# Patient Record
Sex: FEMALE | Race: WHITE | NOT HISPANIC OR LATINO | Employment: FULL TIME | URBAN - NONMETROPOLITAN AREA
[De-identification: names, ages, dates, MRNs, and addresses within clinical notes are randomized per-mention and may not be internally consistent; named-entity substitution may affect disease eponyms.]

---

## 2017-11-08 PROBLEM — E66.9 OBESITY (BMI 30-39.9): Status: ACTIVE | Noted: 2017-11-08

## 2018-06-19 PROBLEM — E78.5 DYSLIPIDEMIA, GOAL LDL BELOW 130: Status: ACTIVE | Noted: 2018-06-19

## 2020-11-03 ENCOUNTER — TELEMEDICINE (OUTPATIENT)
Dept: CARDIOLOGY | Facility: CLINIC | Age: 60
End: 2020-11-03
Payer: COMMERCIAL

## 2020-11-03 VITALS — BODY MASS INDEX: 32.21 KG/M2 | WEIGHT: 225 LBS | HEIGHT: 70 IN

## 2020-11-03 DIAGNOSIS — I44.0 FIRST DEGREE AV BLOCK: Chronic | ICD-10-CM

## 2020-11-03 DIAGNOSIS — R07.2 PRECORDIAL CHEST PAIN: ICD-10-CM

## 2020-11-03 DIAGNOSIS — E78.5 DYSLIPIDEMIA, GOAL LDL BELOW 130: ICD-10-CM

## 2020-11-03 PROCEDURE — 99203 OFFICE O/P NEW LOW 30 MIN: CPT | Mod: 95,CR | Performed by: INTERNAL MEDICINE

## 2020-11-03 ASSESSMENT — ENCOUNTER SYMPTOMS
FOCAL WEAKNESS: 0
PND: 0
CHILLS: 0
BLURRED VISION: 0
SHORTNESS OF BREATH: 0
FEVER: 0
ABDOMINAL PAIN: 0
BRUISES/BLEEDS EASILY: 0
DIZZINESS: 0
PALPITATIONS: 1
FALLS: 0
WEAKNESS: 0
COUGH: 0
CLAUDICATION: 0
SORE THROAT: 0
NAUSEA: 0

## 2020-11-03 ASSESSMENT — FIBROSIS 4 INDEX: FIB4 SCORE: 0.72

## 2020-11-03 NOTE — LETTER
[facility] [place] [address],   [address]nd [address]  [place] [address]  Phone: [phone]  Fax: [phone]              Britany Montesinos  1960    Encounter Date: 11/3/2020    Kurtis Mcleod M.D.          PROGRESS NOTE:  Chief Complaint   Patient presents with   • Arrhythmia       Subjective:   Britany Montesinos is a 60 y.o. female who presents today in consultation from Dr. Marisol Friedman for evaluation of her history of chest pains and palpitations.  Looking back she had a normal lipid profile in 2014 but has been on supplemental testosterone and has remarkable dyslipidemia see in her labs from 2018 LDL was over 200 her total total cholesterol is over 300 with a very high HDL she has been having different concerns was recently diagnosed with coronavirus and so far consultation today was done via Zoom virtual visit.    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   • Dyslipidemia, goal LDL below 130 - unclear maybe due to testosterone    • First degree AV block    • Hot flashes    • Night sweat    • Perimenopausal      Past Surgical History:   Procedure Laterality Date   • PB SHLDR ARTHROSCOP,SURG,W/ROTAT CUFF REPB Right 2/6/2020    Procedure: RT SHOULDER ARTHROSCOPY POSSIBLE ARTHROTOMY ARTHROSCOPIC SUBACROMIAL DECOMPRESSION DISTAL CLAVCILE EXCISON POSSIBLE ROTATOR CUFF REPAIR POSSIBLE BICEPS TENOTOMY;  Surgeon: Faustino Leong M.D.;  Location: SURGERY [facility];  Service: Orthopedics   • BREAST IMPLANT REVISION     • GASTRIC BANDING LAPAROSCOPIC     • OPEN REDUCTION      WRIST   • OTHER NEUROLOGICAL SURG      radio frequency ablaton     Family History   Problem Relation Age of Onset   • Hypertension Mother    • Diabetes Mother    • Heart Disease Mother 77        CHF; age 85 in Aug 2020   • Other Father         Alzheimers   • Other Brother 41        sepsis     Social History     Socioeconomic History   •  Marital status:      Spouse name: Not on file   • Number of children: Not on file   • Years of education: Not on file   • Highest education level: Not on file   Occupational History   • Not on file   Social Needs   • Financial resource strain: Not on file   • Food insecurity     Worry: Not on file     Inability: Not on file   • Transportation needs     Medical: Not on file     Non-medical: Not on file   Tobacco Use   • Smoking status: Former Smoker     Packs/day: 1.00     Years: 5.00     Pack years: 5.00     Types: Cigarettes     Quit date: 1998     Years since quittin.2   • Smokeless tobacco: Never Used   • Tobacco comment: rare/social for smkg at the time   Substance and Sexual Activity   • Alcohol use: Not on file     Comment: few x/month if that   • Drug use: No   • Sexual activity: Yes     Partners: Male     Comment:    Lifestyle   • Physical activity     Days per week: Not on file     Minutes per session: Not on file   • Stress: Not on file   Relationships   • Social connections     Talks on phone: Not on file     Gets together: Not on file     Attends [employer] service: Not on file     Active member of club or organization: Not on file     Attends meetings of clubs or organizations: Not on file     Relationship status: Not on file   • Intimate partner violence     Fear of current or ex partner: Not on file     Emotionally abused: Not on file     Physically abused: Not on file     Forced sexual activity: Not on file   Other Topics Concern   • Not on file   Social History Narrative   • Not on file     Allergies   Allergen Reactions   • Codeine Nausea     Outpatient Encounter Medications as of 11/3/2020   Medication Sig Dispense Refill   • testosterone (TESTIM/ANDROGEL) 50 MG/5GM (1%) Gel gel Apply 50 mg to skin as directed every day.     • progesterone (PROMETRIUM) 100 MG Cap Take 100 mg by mouth every day.     • HYDROcodone-acetaminophen (NORCO) 5-325 MG Tab per tablet Take 1-2 Tabs by  "mouth every four hours as needed.     • Omega 3 1000 MG Cap Take  by mouth.     • Multiple Vitamin (MULTIVITAMINS PO) Take 1 Tab by mouth every day.     • estrogens, conjugated (PREMARIN) 0.625 MG/GM Cream Insert 0.5 g in vagina every day. Indications: natural estrogen product       No facility-administered encounter medications on file as of 11/3/2020.      Review of Systems   Constitutional: Positive for malaise/fatigue. Negative for chills and fever.   HENT: Negative for sore throat.    Eyes: Negative for blurred vision.   Respiratory: Negative for cough and shortness of breath.    Cardiovascular: Positive for chest pain and palpitations. Negative for claudication, leg swelling and PND.   Gastrointestinal: Negative for abdominal pain and nausea.   Musculoskeletal: Positive for joint pain. Negative for falls.   Skin: Negative for rash.   Neurological: Negative for dizziness, focal weakness and weakness.   Endo/Heme/Allergies: Does not bruise/bleed easily.        Objective:   Ht 1.778 m (5' 10\")   Wt 102.1 kg (225 lb)   LMP 02/04/2012   BMI 32.28 kg/m²     Physical Exam   Constitutional: No distress.   HENT:   Patient wearing a mask due to COVID precautions   Eyes: No scleral icterus.   Neck: No JVD present.   Cardiovascular: Normal rate and normal heart sounds. Exam reveals no gallop and no friction rub.   No murmur heard.  Pulmonary/Chest: No respiratory distress. She has no wheezes. She has no rales.   Abdominal: Soft. Bowel sounds are normal.   Musculoskeletal:         General: No edema.   Neurological: She is alert.   Skin: No rash noted. She is not diaphoretic.   Psychiatric: She has a normal mood and affect.     We reviewed in person the most recent labs  Recent Results (from the past 4032 hour(s))   CBC WITH DIFFERENTIAL    Collection Time: 08/07/20  3:40 PM   Result Value Ref Range    WBC 9.1 4.8 - 10.8 K/uL    RBC 5.04 4.20 - 5.40 M/uL    Hemoglobin 14.7 13.0 - 17.0 g/dL    Hematocrit 44.8 39.0 - 50.0 " %    MCV 88.9 81.0 - 99.0 fL    MCH 29.2 27.0 - 31.0 pg    MCHC 32.8 (L) 33.0 - 37.0 g/dL    RDW 13.7 11.5 - 14.5 %    Platelet Count 279 130 - 400 K/uL    MPV 9.4 7.4 - 10.4 fL    Neutrophils Automated 64.1 39.0 - 70.0 %    Lymphocytes Automated 29.1 21.0 - 50.0 %    Monocytes Automated 5.6 2.0 - 9.0 %    Eosinophils Automated 0.8 0.0 - 5.0 %    Basophils Automated 0.2 0.0 - 3.0 %    Abs Neutrophils Automated 5.8 1.8 - 7.7 K/uL    Abs Lymph Automated 2.6 1.2 - 4.8 K/uL    Eosinophil Count, Blood 0.07 0.00 - 0.50 K/uL   Comp Metabolic Panel    Collection Time: 08/07/20  3:40 PM   Result Value Ref Range    Sodium 139 136 - 145 mmol/L    Potassium 3.7 3.5 - 5.1 mmol/L    Chloride 103 98 - 107 mmol/L    Co2 27 21 - 32 mmol/L    Anion Gap 13 10 - 18 mmol/L    Glucose 100 (H) 74 - 99 mg/dL    Bun 19 (H) 7 - 18 mg/dL    Creatinine 1.2 (H) 0.6 - 1.0 mg/dL    Calcium 9.0 8.5 - 11.0 mg/dL    AST(SGOT) 20 15 - 37 U/L    ALT(SGPT) 33 12 - 78 U/L    Alkaline Phosphatase 95 46 - 116 U/L    Total Bilirubin 0.3 0.2 - 1.0 mg/dL    Albumin 4.1 3.4 - 5.0 g/dL    Total Protein 7.5 6.4 - 8.2 g/dL    A-G Ratio 1.2    LIPASE    Collection Time: 08/07/20  3:40 PM   Result Value Ref Range    Lipase 134 73 - 393 U/L   TROPONIN    Collection Time: 08/07/20  3:40 PM   Result Value Ref Range    Troponin I <0.02 0.00 - 0.06 ng/mL   ESTIMATED GFR    Collection Time: 08/07/20  3:40 PM   Result Value Ref Range    GFR If  56 (A) >60 mL/min/1.73 m 2    GFR If Non  46 (A) >60 mL/min/1.73 m 2   COVID/SARS CoV-2 PCR    Collection Time: 10/20/20  2:00 PM   Result Value Ref Range    COVID Order Status RECEIVED    SARS-COV Antigen CYDNEY    Collection Time: 10/20/20  2:00 PM   Result Value Ref Range    SARS-CoV-2 Source NP     SARS-COV ANTIGEN CYDNEY DETECTED (A) Not-Detected       Assessment:     1. Dyslipidemia, goal LDL below 130     2. First degree AV block  EC-ECHOCARDIOGRAM REST/STRESS W/O CONT   3. Precordial chest pain   EC-ECHOCARDIOGRAM REST/STRESS W/O CONT       Medical Decision Making:  Today's Assessment / Status / Plan:     It was my pleasure to meet with Ms. Montesinos.    Blood pressure is well controlled.  She will continue to monitor and eat hearty healthy diet.    Unfortunately she has developed severe dyslipidemia which was not present back in 2014 so I suspect this is mainly due to testosterone supplementation advised her to discuss with her physician the continued use of this medication because it certainly puts her at risk for early coronary artery disease we will evaluate this with a stress echo after she is continue to recuperate from her recent coronavirus infection    We will follow up with Ms. Montesinos on the results of the testing over the phone. We will determine further follow-up from there.    It is my pleasure to participate in the care of Ms. Montesinos.  Please do not hesitate to contact me with questions or concerns.    Kurtis Mcleod MD PhD [facility]  Cardiologist [facility]    Please note that this dictation was created using voice recognition software. There may be errors I did not discover before finalizing the note.     11/3/2020  4:57 PM          Marisol Friedman D.O.  [address] [place] [address]  Via In Basket     Dave Gama M.D.  [address] [place] [address]  Via Fax: [phone]

## 2020-11-04 NOTE — PROGRESS NOTES
Chief Complaint   Patient presents with   • Arrhythmia       Subjective:   Britany Montesinos is a 60 y.o. female who presents today in consultation from Dr. Marisol Friedman for evaluation of her history of chest pains and palpitations.  Looking back she had a normal lipid profile in 2014 but has been on supplemental testosterone and has remarkable dyslipidemia see in her labs from 2018 LDL was over 200 her total total cholesterol is over 300 with a very high HDL she has been having different concerns was recently diagnosed with coronavirus and so far consultation today was done via Zoom virtual visit.    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   • Dyslipidemia, goal LDL below 130 - unclear maybe due to testosterone    • First degree AV block    • Hot flashes    • Night sweat    • Perimenopausal      Past Surgical History:   Procedure Laterality Date   • PB SHLDR ARTHROSCOP,SURG,W/ROTAT CUFF REPB Right 2/6/2020    Procedure: RT SHOULDER ARTHROSCOPY POSSIBLE ARTHROTOMY ARTHROSCOPIC SUBACROMIAL DECOMPRESSION DISTAL CLAVCILE EXCISON POSSIBLE ROTATOR CUFF REPAIR POSSIBLE BICEPS TENOTOMY;  Surgeon: Faustino Leong M.D.;  Location: SURGERY [facility];  Service: Orthopedics   • BREAST IMPLANT REVISION     • GASTRIC BANDING LAPAROSCOPIC     • OPEN REDUCTION      WRIST   • OTHER NEUROLOGICAL SURG      radio frequency ablaton     Family History   Problem Relation Age of Onset   • Hypertension Mother    • Diabetes Mother    • Heart Disease Mother 77        CHF; age 85 in Aug 2020   • Other Father         Alzheimers   • Other Brother 41        sepsis     Social History     Socioeconomic History   • Marital status:      Spouse name: Not on file   • Number of children: Not on file   • Years of education: Not on file   • Highest education level: Not on file   Occupational History   • Not on file   Social Needs   • Financial resource strain: Not on file   • Food insecurity     Worry: Not  on file     Inability: Not on file   • Transportation needs     Medical: Not on file     Non-medical: Not on file   Tobacco Use   • Smoking status: Former Smoker     Packs/day: 1.00     Years: 5.00     Pack years: 5.00     Types: Cigarettes     Quit date: 1998     Years since quittin.2   • Smokeless tobacco: Never Used   • Tobacco comment: rare/social for smkg at the time   Substance and Sexual Activity   • Alcohol use: Not on file     Comment: few x/month if that   • Drug use: No   • Sexual activity: Yes     Partners: Male     Comment:    Lifestyle   • Physical activity     Days per week: Not on file     Minutes per session: Not on file   • Stress: Not on file   Relationships   • Social connections     Talks on phone: Not on file     Gets together: Not on file     Attends [employer] service: Not on file     Active member of club or organization: Not on file     Attends meetings of clubs or organizations: Not on file     Relationship status: Not on file   • Intimate partner violence     Fear of current or ex partner: Not on file     Emotionally abused: Not on file     Physically abused: Not on file     Forced sexual activity: Not on file   Other Topics Concern   • Not on file   Social History Narrative   • Not on file     Allergies   Allergen Reactions   • Codeine Nausea     Outpatient Encounter Medications as of 11/3/2020   Medication Sig Dispense Refill   • testosterone (TESTIM/ANDROGEL) 50 MG/5GM (1%) Gel gel Apply 50 mg to skin as directed every day.     • progesterone (PROMETRIUM) 100 MG Cap Take 100 mg by mouth every day.     • HYDROcodone-acetaminophen (NORCO) 5-325 MG Tab per tablet Take 1-2 Tabs by mouth every four hours as needed.     • Omega 3 1000 MG Cap Take  by mouth.     • Multiple Vitamin (MULTIVITAMINS PO) Take 1 Tab by mouth every day.     • estrogens, conjugated (PREMARIN) 0.625 MG/GM Cream Insert 0.5 g in vagina every day. Indications: natural estrogen product       No  "facility-administered encounter medications on file as of 11/3/2020.      Review of Systems   Constitutional: Positive for malaise/fatigue. Negative for chills and fever.   HENT: Negative for sore throat.    Eyes: Negative for blurred vision.   Respiratory: Negative for cough and shortness of breath.    Cardiovascular: Positive for chest pain and palpitations. Negative for claudication, leg swelling and PND.   Gastrointestinal: Negative for abdominal pain and nausea.   Musculoskeletal: Positive for joint pain. Negative for falls.   Skin: Negative for rash.   Neurological: Negative for dizziness, focal weakness and weakness.   Endo/Heme/Allergies: Does not bruise/bleed easily.        Objective:   Ht 1.778 m (5' 10\")   Wt 102.1 kg (225 lb)   LMP 02/04/2012   BMI 32.28 kg/m²     Vital signs are reported by the patient    General appears well  Eyes no icterus  Extremities no edema  Skin no apparent rashes    This encounter was conducted via [email or web address]  Video Virtual Visit.   Verbal consent was obtained. Patient's identity was verified.    We reviewed in person the most recent labs  Recent Results (from the past 4032 hour(s))   CBC WITH DIFFERENTIAL    Collection Time: 08/07/20  3:40 PM   Result Value Ref Range    WBC 9.1 4.8 - 10.8 K/uL    RBC 5.04 4.20 - 5.40 M/uL    Hemoglobin 14.7 13.0 - 17.0 g/dL    Hematocrit 44.8 39.0 - 50.0 %    MCV 88.9 81.0 - 99.0 fL    MCH 29.2 27.0 - 31.0 pg    MCHC 32.8 (L) 33.0 - 37.0 g/dL    RDW 13.7 11.5 - 14.5 %    Platelet Count 279 130 - 400 K/uL    MPV 9.4 7.4 - 10.4 fL    Neutrophils Automated 64.1 39.0 - 70.0 %    Lymphocytes Automated 29.1 21.0 - 50.0 %    Monocytes Automated 5.6 2.0 - 9.0 %    Eosinophils Automated 0.8 0.0 - 5.0 %    Basophils Automated 0.2 0.0 - 3.0 %    Abs Neutrophils Automated 5.8 1.8 - 7.7 K/uL    Abs Lymph Automated 2.6 1.2 - 4.8 K/uL    Eosinophil Count, Blood 0.07 0.00 - 0.50 K/uL   Comp Metabolic Panel    Collection Time: 08/07/20  3:40 PM   Result Value " Ref Range    Sodium 139 136 - 145 mmol/L    Potassium 3.7 3.5 - 5.1 mmol/L    Chloride 103 98 - 107 mmol/L    Co2 27 21 - 32 mmol/L    Anion Gap 13 10 - 18 mmol/L    Glucose 100 (H) 74 - 99 mg/dL    Bun 19 (H) 7 - 18 mg/dL    Creatinine 1.2 (H) 0.6 - 1.0 mg/dL    Calcium 9.0 8.5 - 11.0 mg/dL    AST(SGOT) 20 15 - 37 U/L    ALT(SGPT) 33 12 - 78 U/L    Alkaline Phosphatase 95 46 - 116 U/L    Total Bilirubin 0.3 0.2 - 1.0 mg/dL    Albumin 4.1 3.4 - 5.0 g/dL    Total Protein 7.5 6.4 - 8.2 g/dL    A-G Ratio 1.2    LIPASE    Collection Time: 08/07/20  3:40 PM   Result Value Ref Range    Lipase 134 73 - 393 U/L   TROPONIN    Collection Time: 08/07/20  3:40 PM   Result Value Ref Range    Troponin I <0.02 0.00 - 0.06 ng/mL   ESTIMATED GFR    Collection Time: 08/07/20  3:40 PM   Result Value Ref Range    GFR If  56 (A) >60 mL/min/1.73 m 2    GFR If Non  46 (A) >60 mL/min/1.73 m 2   COVID/SARS CoV-2 PCR    Collection Time: 10/20/20  2:00 PM   Result Value Ref Range    COVID Order Status RECEIVED    SARS-COV Antigen CYDNEY    Collection Time: 10/20/20  2:00 PM   Result Value Ref Range    SARS-CoV-2 Source NP     SARS-COV ANTIGEN YCDNEY DETECTED (A) Not-Detected       Assessment:     1. Dyslipidemia, goal LDL below 130     2. First degree AV block  EC-ECHOCARDIOGRAM REST/STRESS W/O CONT   3. Precordial chest pain  EC-ECHOCARDIOGRAM REST/STRESS W/O CONT       Medical Decision Making:  Today's Assessment / Status / Plan:     It was my pleasure to meet with Ms. Montesinos.    Blood pressure is well controlled.  She will continue to monitor and eat hearty healthy diet.    Unfortunately she has developed severe dyslipidemia which was not present back in 2014 so I suspect this is mainly due to testosterone supplementation advised her to discuss with her physician the continued use of this medication because it certainly puts her at risk for early coronary artery disease we will evaluate this with a stress echo  after she is continue to recuperate from her recent coronavirus infection    We will follow up with Ms. Montesinos on the results of the testing over the phone. We will determine further follow-up from there.    It is my pleasure to participate in the care of Ms. Montesinos.  Please do not hesitate to contact me with questions or concerns.    Kurtis Mcleod MD PhD [facility]  Cardiologist [facility]    Please note that this dictation was created using voice recognition software. There may be errors I did not discover before finalizing the note.     11/3/2020  4:57 PM

## 2020-11-13 ENCOUNTER — TELEPHONE (OUTPATIENT)
Dept: CARDIOLOGY | Facility: MEDICAL CENTER | Age: 60
End: 2020-11-13

## 2020-11-13 NOTE — TELEPHONE ENCOUNTER
CW/janell    Pt calling to ask you to fax the echo order to [facility], (pt does not have the fx#).    Britany is having the echo done there.     Please call Britany to confirm this has been taken care of, [phone] so she can schedule.

## 2020-11-16 NOTE — TELEPHONE ENCOUNTER
Called [facility] Lita Diop Shari, [phone], and reviewed patient request.  She instructs to sent Staff Message to [employer] regarding request.  She states if pt has not heard from [facility] after Monday scheduling to call, [phone] imaging schedulers.      Staff message sent to [facility] [employer] regarding request.    Called pt and reviewed findings.  She verbalizes understanding but states she is out and is unable to record [facility] imaging phone number.  Reassurance given that [facility]as[facility] will be sent to pt regarding phone number.  She states no other concerns or questions at this time and is appreciative of information given.    MyChart sent to pt regarding findings.

## 2020-12-04 DIAGNOSIS — I44.0 FIRST DEGREE AV BLOCK: Chronic | ICD-10-CM

## 2020-12-04 DIAGNOSIS — R07.2 PRECORDIAL CHEST PAIN: ICD-10-CM

## 2020-12-07 ENCOUNTER — PATIENT MESSAGE (OUTPATIENT)
Dept: CARDIOLOGY | Facility: CLINIC | Age: 60
End: 2020-12-07

## 2020-12-07 ENCOUNTER — TELEPHONE (OUTPATIENT)
Dept: CARDIOLOGY | Facility: MEDICAL CENTER | Age: 60
End: 2020-12-07

## 2020-12-07 DIAGNOSIS — R07.2 PRECORDIAL CHEST PAIN: ICD-10-CM

## 2020-12-07 DIAGNOSIS — R93.1 ABNORMAL ECHOCARDIOGRAM: ICD-10-CM

## 2020-12-07 DIAGNOSIS — R07.89 OTHER CHEST PAIN: ICD-10-CM

## 2020-12-07 NOTE — TELEPHONE ENCOUNTER
Received phone call from Shari [facility], and states pt will still be able to have Echo stress done since order was not fufilled.  She states she will call [facility] to follow up with testing and scheduling.

## 2020-12-07 NOTE — TELEPHONE ENCOUNTER
----- Message from Kurtis Mcleod M.D. sent at 12/4/2020  5:13 PM PST -----  The echo looks okay but wasn't a stress test, she should have a nuclear treadmil stress SPECT indication is abnormal echo and CP, please let her know     Thank you

## 2020-12-07 NOTE — TELEPHONE ENCOUNTER
Patient Call  Shari Villalobos  You 5 minutes ago (9:33 AM)     I left you a vm to asking if the provider wants the NM stress test he ordered on 12/4 done instead of the treadmill stress test that was left off the last exam by [facility].   Shari    Message text      Reviewed voicemail from Shari.    Received phone call from Shari and reviewed findings.  She states she will call [facility] so they can contact pt to have stress portion done on testing.      NM-Stress test cancelled.

## 2020-12-07 NOTE — TELEPHONE ENCOUNTER
"Called pt and reviewed MD recommendations.  She notes she does have CP at rest and \"only had 1 or 2 ever\" during her 30 minute [employer] ride.  She states she was told they did not do the stress test that day at the location testing was sent to so she did not want to wait and proceeded with Echo.  She verbalizes understanding and is requesting for contact information for scheduling as she has had issues with scheduling department, [facility].    Stress test ordered.  Staff message sent to [facility] [place] regarding testing.  "

## 2020-12-15 ENCOUNTER — TELEMEDICINE (OUTPATIENT)
Dept: CARDIOLOGY | Facility: MEDICAL CENTER | Age: 60
End: 2020-12-15
Payer: COMMERCIAL

## 2020-12-15 VITALS — WEIGHT: 225 LBS | HEIGHT: 70 IN | BODY MASS INDEX: 32.21 KG/M2

## 2020-12-15 DIAGNOSIS — I44.0 FIRST DEGREE AV BLOCK: Chronic | ICD-10-CM

## 2020-12-15 DIAGNOSIS — E78.5 DYSLIPIDEMIA, GOAL LDL BELOW 130: Chronic | ICD-10-CM

## 2020-12-15 PROCEDURE — 99442 PR PHYSICIAN TELEPHONE EVALUATION 11-20 MIN: CPT | Mod: CR | Performed by: INTERNAL MEDICINE

## 2020-12-15 ASSESSMENT — ENCOUNTER SYMPTOMS
WEAKNESS: 0
CHILLS: 0
NAUSEA: 0
SORE THROAT: 0
BLURRED VISION: 0
ABDOMINAL PAIN: 0
FEVER: 0
FALLS: 0
BRUISES/BLEEDS EASILY: 0
FOCAL WEAKNESS: 0
PND: 0
PALPITATIONS: 0
COUGH: 0
SHORTNESS OF BREATH: 0
CLAUDICATION: 0
DIZZINESS: 0

## 2020-12-15 ASSESSMENT — FIBROSIS 4 INDEX: FIB4 SCORE: 0.72

## 2020-12-15 NOTE — PROGRESS NOTES
Chief Complaint   Patient presents with   • Follow-Up     Echo       Subjective:   Britany Montesinos is a 60 y.o. female who presents today for follow-up of her history of abnormal EKG and chest pains.  We had ordered do a stress echo but this was just done as a regular echocardiogram at [facility] unclear why and so she was able to finish the stress portion today we are still waiting on the results of that.    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   • Dyslipidemia, goal LDL below 130 - unclear maybe due to testosterone    • First degree AV block    • Hot flashes    • Night sweat    • Perimenopausal      Past Surgical History:   Procedure Laterality Date   • PB SHLDR ARTHROSCOP,SURG,W/ROTAT CUFF REPB Right 2/6/2020    Procedure: RT SHOULDER ARTHROSCOPY POSSIBLE ARTHROTOMY ARTHROSCOPIC SUBACROMIAL DECOMPRESSION DISTAL CLAVCILE EXCISON POSSIBLE ROTATOR CUFF REPAIR POSSIBLE BICEPS TENOTOMY;  Surgeon: Faustino Leong M.D.;  Location: SURGERY [facility];  Service: Orthopedics   • BREAST IMPLANT REVISION     • GASTRIC BANDING LAPAROSCOPIC     • OPEN REDUCTION      WRIST   • OTHER NEUROLOGICAL SURG      radio frequency ablaton     Family History   Problem Relation Age of Onset   • Hypertension Mother    • Diabetes Mother    • Heart Disease Mother 77        CHF; age 85 in Aug 2020   • Other Father         Alzheimers   • Other Brother 41        sepsis     Social History     Socioeconomic History   • Marital status:      Spouse name: Not on file   • Number of children: Not on file   • Years of education: Not on file   • Highest education level: Not on file   Occupational History   • Not on file   Social Needs   • Financial resource strain: Not on file   • Food insecurity     Worry: Not on file     Inability: Not on file   • Transportation needs     Medical: Not on file     Non-medical: Not on file   Tobacco Use   • Smoking status: Former Smoker     Packs/day: 1.00     Years:  5.00     Pack years: 5.00     Types: Cigarettes     Quit date: 1998     Years since quittin.3   • Smokeless tobacco: Never Used   • Tobacco comment: rare/social for smkg at the time   Substance and Sexual Activity   • Alcohol use: Not on file     Comment: few x/month if that   • Drug use: No   • Sexual activity: Yes     Partners: Male     Comment:    Lifestyle   • Physical activity     Days per week: Not on file     Minutes per session: Not on file   • Stress: Not on file   Relationships   • Social connections     Talks on phone: Not on file     Gets together: Not on file     Attends Tenriism service: Not on file     Active member of club or organization: Not on file     Attends meetings of clubs or organizations: Not on file     Relationship status: Not on file   • Intimate partner violence     Fear of current or ex partner: Not on file     Emotionally abused: Not on file     Physically abused: Not on file     Forced sexual activity: Not on file   Other Topics Concern   • Not on file   Social History Narrative   • Not on file     Allergies   Allergen Reactions   • Codeine Nausea     Outpatient Encounter Medications as of 12/15/2020   Medication Sig Dispense Refill   • progesterone (PROMETRIUM) 100 MG Cap Take 100 mg by mouth every day.     • HYDROcodone-acetaminophen (NORCO) 5-325 MG Tab per tablet Take 1-2 Tabs by mouth every four hours as needed.     • Omega 3 1000 MG Cap Take  by mouth.     • estrogens, conjugated (PREMARIN) 0.625 MG/GM Cream Insert 0.5 g in vagina every day. Indications: natural estrogen product     • Multiple Vitamin (MULTIVITAMINS PO) Take 1 Tab by mouth every day.     • testosterone (TESTIM/ANDROGEL) 50 MG/5GM (1%) Gel gel Apply 50 mg to skin as directed every day.       No facility-administered encounter medications on file as of 12/15/2020.      Review of Systems   Constitutional: Negative for chills and fever.   HENT: Negative for sore throat.    Eyes: Negative for  "blurred vision.   Respiratory: Negative for cough and shortness of breath.    Cardiovascular: Positive for chest pain. Negative for palpitations, claudication, leg swelling and PND.   Gastrointestinal: Negative for abdominal pain and nausea.   Musculoskeletal: Negative for falls and joint pain.   Skin: Negative for rash.   Neurological: Negative for dizziness, focal weakness and weakness.   Endo/Heme/Allergies: Does not bruise/bleed easily.        Objective:   BP (!) 0/0 (BP Location: Left arm, Patient Position: Sitting, BP Cuff Size: Adult)   Ht 1.778 m (5' 10\")   Wt 102.1 kg (225 lb)   LMP 02/04/2012   BMI 32.28 kg/m²     Physical Exam  This visit was conducted entirely over the phone due to the infection concerns from COVID 19, the vital signs recorded are self-reported by the patient from today and prior recent record    Reports that her echocardiogram at [facility] showed a subtle wall motion abnormality in the septal wall    Assessment:     1. First degree AV block     2. Dyslipidemia, goal LDL below 130 - unclear maybe due to testosterone         Medical Decision Making:  Today's Assessment / Status / Plan:     It was my pleasure to meet with Ms. Montesinos.    We are still waiting on the formal report of her stress echo hopefully it is okay but I am concerned as she has a resting echo abnormality she reports that she reached out to her holistic doctor on her testosterone supplement and they did not expect to see such as a significant change in her lipid profile perhaps that it was due to diet in any case would certainly benefit from diet modification likely cessation of testosterone therapy.    We will follow up with Ms. Montesinos on the results of the testing over the phone. We will determine further follow-up from there.    It is my pleasure to participate in the care of Ms. Montesinos.  Please do not hesitate to contact me with questions or concerns.    Kurtis Mcleod MD PhD [facility]  Cardiologist [facility]    Please note that this dictation was created using voice recognition software. There may be errors I did not discover before finalizing the note.     12/15/2020  3:45 PM     Telephone Appointment Visit   As a means of avoiding spread of COVID-19, this visit is being conducted by telephone. This telephone visit was initiated by the patient and they verbally consented.    Time at start of call: 330    Reason for Call:  Results follow up    HPI:         Labs / Images Reviewed:        Assessment and Plan:     1. First degree AV block    2. Dyslipidemia, goal LDL below 130 - unclear maybe due to testosterone      Follow-up:     Time at end of call: 342  Total Time Spent: 11-20 minutes    Kurtis Mcleod M.D.

## 2020-12-16 ENCOUNTER — PATIENT MESSAGE (OUTPATIENT)
Dept: CARDIOLOGY | Facility: MEDICAL CENTER | Age: 60
End: 2020-12-16

## 2020-12-16 ENCOUNTER — TELEPHONE (OUTPATIENT)
Dept: CARDIOLOGY | Facility: MEDICAL CENTER | Age: 60
End: 2020-12-16

## 2020-12-16 NOTE — TELEPHONE ENCOUNTER
CW    Pt called stating her results from her last two echocardiograms are not in her mychart and is asking if you would call her back with the results or put them on her mychart. Please call Pt back at [phone].

## 2020-12-17 ENCOUNTER — PATIENT MESSAGE (OUTPATIENT)
Dept: CARDIOLOGY | Facility: MEDICAL CENTER | Age: 60
End: 2020-12-17

## 2020-12-18 ENCOUNTER — TELEPHONE (OUTPATIENT)
Dept: CARDIOLOGY | Facility: MEDICAL CENTER | Age: 60
End: 2020-12-18

## 2020-12-18 DIAGNOSIS — I44.1 AV BLOCK, MOBITZ 2: Chronic | ICD-10-CM

## 2020-12-18 DIAGNOSIS — I44.1 AV BLOCK, MOBITZ 2: ICD-10-CM

## 2020-12-18 NOTE — PATIENT COMMUNICATION
Request relayed to [facility] medical records requesting for Stress Echo Results.    Fax sent to [facility] medical records, [phone], undelivered status.    Will try again next business day.

## 2020-12-18 NOTE — PATIENT COMMUNICATION
2nd attempt to fax STAT request for stress echo results, [facility] medical records [phone], completed status.    Awaiting for results to be received.

## 2020-12-18 NOTE — TELEPHONE ENCOUNTER
Returned pt call and reviewed MD recommendations.  She verbalizes understanding and states she would like to proceed.  She is requesting to have testing completed prior to January 15 as she is leaving to go on a trip until the 24th.  Reassurance given that findings will be relayed to FITO CLINTON to schedule testing.  She verbalizes understanding and states no other concerns or questions at this time.  Pt is appreciative of information given.      Task deferred to FITO CLINTON to schedule pt for testing.

## 2020-12-18 NOTE — TELEPHONE ENCOUNTER
Reviewed stress test, she has poor exertional tolerance but they describe acceptable wall with exercise    She is also described as having Mobitz 2 in recovery    Let her know that they describe her having normal heart performance with exercise, no signs for significant blockages but she did have possible arrhythmia on the treadmill after that test that we need to investigate further    Please have her wear a event monitor Zio patch for 2 weeks

## 2020-12-31 ENCOUNTER — NON-PROVIDER VISIT (OUTPATIENT)
Dept: CARDIOLOGY | Facility: MEDICAL CENTER | Age: 60
End: 2020-12-31
Payer: COMMERCIAL

## 2020-12-31 ENCOUNTER — TELEPHONE (OUTPATIENT)
Dept: CARDIOLOGY | Facility: MEDICAL CENTER | Age: 60
End: 2020-12-31

## 2020-12-31 DIAGNOSIS — I44.0 AV BLOCK, 1ST DEGREE: ICD-10-CM

## 2020-12-31 DIAGNOSIS — I47.10 SVT (SUPRAVENTRICULAR TACHYCARDIA) (HCC): ICD-10-CM

## 2020-12-31 NOTE — TELEPHONE ENCOUNTER
>Pt. enrolled in 14 day Zio Patch program  >Hookup in [facility]  >Pending EOS.    INS: [employer]

## 2021-01-18 DIAGNOSIS — I44.1 AV BLOCK, MOBITZ 2: ICD-10-CM

## 2021-01-18 PROCEDURE — 93246 EXT ECG>7D<15D RECORDING: CPT | Performed by: INTERNAL MEDICINE

## 2021-01-18 PROCEDURE — 93248 EXT ECG>7D<15D REV&INTERPJ: CPT | Performed by: INTERNAL MEDICINE

## 2021-01-25 ENCOUNTER — PATIENT MESSAGE (OUTPATIENT)
Dept: CARDIOLOGY | Facility: MEDICAL CENTER | Age: 61
End: 2021-01-25

## 2021-01-29 NOTE — PROGRESS NOTES
Testing for us and including extensive testing procedure at [facility] suggests not related to heart, follow up with PMD.

## 2021-09-03 PROBLEM — R94.2 DECREASED FUNCTIONAL RESIDUAL CAPACITY: Status: ACTIVE | Noted: 2021-01-06

## 2021-09-03 PROBLEM — R00.2 PALPITATIONS: Status: ACTIVE | Noted: 2021-01-06

## 2021-09-03 PROBLEM — R07.89 ATYPICAL CHEST PAIN: Status: ACTIVE | Noted: 2021-09-03

## 2021-09-03 PROBLEM — Z78.9 FALSE POSITIVE STRESS TEST: Status: ACTIVE | Noted: 2021-09-03

## 2021-09-03 PROBLEM — Z98.890: Status: ACTIVE | Noted: 2021-01-12

## 2022-03-07 ENCOUNTER — PRE-ADMISSION TESTING (OUTPATIENT)
Dept: ADMISSIONS | Facility: MEDICAL CENTER | Age: 62
End: 2022-03-07
Attending: STUDENT IN AN ORGANIZED HEALTH CARE EDUCATION/TRAINING PROGRAM
Payer: COMMERCIAL

## 2022-03-07 VITALS — WEIGHT: 250 LBS | HEIGHT: 70 IN | BODY MASS INDEX: 35.79 KG/M2

## 2022-03-07 ASSESSMENT — FIBROSIS 4 INDEX: FIB4 SCORE: 0.98

## 2022-03-07 NOTE — PREPROCEDURE INSTRUCTIONS
I do not see an ECG accompanying this current summary. I am unable to provide any assurances that her case may or may not get canceled as that ultimate decision is left to the assigned anesthesiologist given the full evaluation and understanding of the clinical scenario. If she has a cardiology clearance, then there is not much more we can ask for at this time. I would, however, like to see the newest ECG if possible in case of any acute abnormalities. Thank you.  Catherine Vadlivia M.D.  [facility]

## 2022-03-07 NOTE — PREPROCEDURE INSTRUCTIONS
62 y/o female with hx of First degree AV block, AV block, Mobitz 2 reported on stress echo 12/15/2020.  METS > 4.  No meds except for hormone therapy and OTC vitamins.  She states that she has had surgery cancelled twice at [facility] [place] due to her heart condition.  She has a cardiac clearance from [facility] from 12/23/21.  She is considered a low risk.  EKG is also on the chart.  She asked me to run her case by you to avoid any last minute cancellations.  I did not order any testing today because she does not fall under the protocol.  Is there anything else you might need to approve moving forward with this case?  Thanks, Esperanza JACOBS    Anesthesia Summary Report           Patient Name: Britany Montesinos Janette MRN: [id number] Admission Date: Patient not admitted   Allergies: Codeine

## 2022-03-08 NOTE — PREPROCEDURE INSTRUCTIONS
We will need a newer ECG.  Thank you.   Catherine Valdivia M.D.  [facility]

## 2022-03-22 ENCOUNTER — APPOINTMENT (OUTPATIENT)
Dept: ADMISSIONS | Facility: MEDICAL CENTER | Age: 62
End: 2022-03-22
Attending: STUDENT IN AN ORGANIZED HEALTH CARE EDUCATION/TRAINING PROGRAM
Payer: COMMERCIAL

## 2022-03-22 DIAGNOSIS — Z01.812 PRE-OPERATIVE LABORATORY EXAMINATION: ICD-10-CM

## 2022-03-22 DIAGNOSIS — Z01.810 PRE-OPERATIVE CARDIOVASCULAR EXAMINATION: ICD-10-CM

## 2022-03-22 LAB — EKG IMPRESSION: NORMAL

## 2022-03-22 PROCEDURE — 93005 ELECTROCARDIOGRAM TRACING: CPT

## 2022-03-22 PROCEDURE — U0003 INFECTIOUS AGENT DETECTION BY NUCLEIC ACID (DNA OR RNA); SEVERE ACUTE RESPIRATORY SYNDROME CORONAVIRUS 2 (SARS-COV-2) (CORONAVIRUS DISEASE [COVID-19]), AMPLIFIED PROBE TECHNIQUE, MAKING USE OF HIGH THROUGHPUT TECHNOLOGIES AS DESCRIBED BY CMS-2020-01-R: HCPCS

## 2022-03-22 PROCEDURE — 93010 ELECTROCARDIOGRAM REPORT: CPT | Performed by: INTERNAL MEDICINE

## 2022-03-22 PROCEDURE — C9803 HOPD COVID-19 SPEC COLLECT: HCPCS

## 2022-03-22 PROCEDURE — U0005 INFEC AGEN DETEC AMPLI PROBE: HCPCS

## 2022-03-23 LAB
SARS-COV-2 RNA RESP QL NAA+PROBE: NOTDETECTED
SPECIMEN SOURCE: NORMAL

## 2022-03-23 NOTE — OR NURSING
ECG from 3/22/2022 emailed to Dr. Valdivia for review    Thank you for the follow up. Nothing further to add at this time. Thank you.  Catherine Valdivia M.D.  [facility]

## 2022-03-24 ENCOUNTER — ANESTHESIA EVENT (OUTPATIENT)
Dept: SURGERY | Facility: MEDICAL CENTER | Age: 62
End: 2022-03-24
Payer: COMMERCIAL

## 2022-03-25 ENCOUNTER — ANESTHESIA (OUTPATIENT)
Dept: SURGERY | Facility: MEDICAL CENTER | Age: 62
End: 2022-03-25
Payer: COMMERCIAL

## 2022-03-25 ENCOUNTER — HOSPITAL ENCOUNTER (OUTPATIENT)
Facility: MEDICAL CENTER | Age: 62
End: 2022-03-25
Attending: STUDENT IN AN ORGANIZED HEALTH CARE EDUCATION/TRAINING PROGRAM | Admitting: STUDENT IN AN ORGANIZED HEALTH CARE EDUCATION/TRAINING PROGRAM
Payer: COMMERCIAL

## 2022-03-25 VITALS
TEMPERATURE: 97.2 F | HEIGHT: 70 IN | SYSTOLIC BLOOD PRESSURE: 110 MMHG | WEIGHT: 256.39 LBS | BODY MASS INDEX: 36.71 KG/M2 | DIASTOLIC BLOOD PRESSURE: 62 MMHG | OXYGEN SATURATION: 90 % | RESPIRATION RATE: 16 BRPM | HEART RATE: 57 BPM

## 2022-03-25 PROCEDURE — 700111 HCHG RX REV CODE 636 W/ 250 OVERRIDE (IP): Performed by: STUDENT IN AN ORGANIZED HEALTH CARE EDUCATION/TRAINING PROGRAM

## 2022-03-25 PROCEDURE — 500152 HCHG CANNULA, TIB TUNNEL: Performed by: STUDENT IN AN ORGANIZED HEALTH CARE EDUCATION/TRAINING PROGRAM

## 2022-03-25 PROCEDURE — 64415 NJX AA&/STRD BRCH PLXS IMG: CPT | Performed by: STUDENT IN AN ORGANIZED HEALTH CARE EDUCATION/TRAINING PROGRAM

## 2022-03-25 PROCEDURE — 700101 HCHG RX REV CODE 250: Performed by: STUDENT IN AN ORGANIZED HEALTH CARE EDUCATION/TRAINING PROGRAM

## 2022-03-25 PROCEDURE — 700105 HCHG RX REV CODE 258: Performed by: STUDENT IN AN ORGANIZED HEALTH CARE EDUCATION/TRAINING PROGRAM

## 2022-03-25 PROCEDURE — 700102 HCHG RX REV CODE 250 W/ 637 OVERRIDE(OP): Performed by: ANESTHESIOLOGY

## 2022-03-25 PROCEDURE — 160046 HCHG PACU - 1ST 60 MINS PHASE II: Performed by: STUDENT IN AN ORGANIZED HEALTH CARE EDUCATION/TRAINING PROGRAM

## 2022-03-25 PROCEDURE — A9270 NON-COVERED ITEM OR SERVICE: HCPCS | Performed by: ANESTHESIOLOGY

## 2022-03-25 PROCEDURE — 160035 HCHG PACU - 1ST 60 MINS PHASE I: Performed by: STUDENT IN AN ORGANIZED HEALTH CARE EDUCATION/TRAINING PROGRAM

## 2022-03-25 PROCEDURE — 500151 HCHG CANNULA, THRDED 8.4: Performed by: STUDENT IN AN ORGANIZED HEALTH CARE EDUCATION/TRAINING PROGRAM

## 2022-03-25 PROCEDURE — 160025 RECOVERY II MINUTES (STATS): Performed by: STUDENT IN AN ORGANIZED HEALTH CARE EDUCATION/TRAINING PROGRAM

## 2022-03-25 PROCEDURE — 160009 HCHG ANES TIME/MIN: Performed by: STUDENT IN AN ORGANIZED HEALTH CARE EDUCATION/TRAINING PROGRAM

## 2022-03-25 PROCEDURE — 500112 HCHG BONE WAX: Performed by: STUDENT IN AN ORGANIZED HEALTH CARE EDUCATION/TRAINING PROGRAM

## 2022-03-25 PROCEDURE — 501838 HCHG SUTURE GENERAL: Performed by: STUDENT IN AN ORGANIZED HEALTH CARE EDUCATION/TRAINING PROGRAM

## 2022-03-25 PROCEDURE — C1713 ANCHOR/SCREW BN/BN,TIS/BN: HCPCS | Performed by: STUDENT IN AN ORGANIZED HEALTH CARE EDUCATION/TRAINING PROGRAM

## 2022-03-25 PROCEDURE — 160041 HCHG SURGERY MINUTES - EA ADDL 1 MIN LEVEL 4: Performed by: STUDENT IN AN ORGANIZED HEALTH CARE EDUCATION/TRAINING PROGRAM

## 2022-03-25 PROCEDURE — 700111 HCHG RX REV CODE 636 W/ 250 OVERRIDE (IP): Performed by: ANESTHESIOLOGY

## 2022-03-25 PROCEDURE — 502000 HCHG MISC OR IMPLANTS RC 0278: Performed by: STUDENT IN AN ORGANIZED HEALTH CARE EDUCATION/TRAINING PROGRAM

## 2022-03-25 PROCEDURE — 160002 HCHG RECOVERY MINUTES (STAT): Performed by: STUDENT IN AN ORGANIZED HEALTH CARE EDUCATION/TRAINING PROGRAM

## 2022-03-25 PROCEDURE — 160048 HCHG OR STATISTICAL LEVEL 1-5: Performed by: STUDENT IN AN ORGANIZED HEALTH CARE EDUCATION/TRAINING PROGRAM

## 2022-03-25 PROCEDURE — 700101 HCHG RX REV CODE 250: Performed by: ANESTHESIOLOGY

## 2022-03-25 PROCEDURE — 500028 HCHG ARTHROWAND TURBOVAC 3.5/90 SUCT.: Performed by: STUDENT IN AN ORGANIZED HEALTH CARE EDUCATION/TRAINING PROGRAM

## 2022-03-25 PROCEDURE — 160029 HCHG SURGERY MINUTES - 1ST 30 MINS LEVEL 4: Performed by: STUDENT IN AN ORGANIZED HEALTH CARE EDUCATION/TRAINING PROGRAM

## 2022-03-25 DEVICE — IMPLANTABLE DEVICE: Type: IMPLANTABLE DEVICE | Status: FUNCTIONAL

## 2022-03-25 RX ORDER — HALOPERIDOL 5 MG/ML
1 INJECTION INTRAMUSCULAR
Status: DISCONTINUED | OUTPATIENT
Start: 2022-03-25 | End: 2022-03-25 | Stop reason: HOSPADM

## 2022-03-25 RX ORDER — HYDROMORPHONE HYDROCHLORIDE 1 MG/ML
0.4 INJECTION, SOLUTION INTRAMUSCULAR; INTRAVENOUS; SUBCUTANEOUS
Status: DISCONTINUED | OUTPATIENT
Start: 2022-03-25 | End: 2022-03-25 | Stop reason: HOSPADM

## 2022-03-25 RX ORDER — HYDROMORPHONE HYDROCHLORIDE 1 MG/ML
0.2 INJECTION, SOLUTION INTRAMUSCULAR; INTRAVENOUS; SUBCUTANEOUS
Status: DISCONTINUED | OUTPATIENT
Start: 2022-03-25 | End: 2022-03-25 | Stop reason: HOSPADM

## 2022-03-25 RX ORDER — ROCURONIUM BROMIDE 10 MG/ML
INJECTION, SOLUTION INTRAVENOUS PRN
Status: DISCONTINUED | OUTPATIENT
Start: 2022-03-25 | End: 2022-03-25 | Stop reason: SURG

## 2022-03-25 RX ORDER — MIDAZOLAM HYDROCHLORIDE 1 MG/ML
INJECTION INTRAMUSCULAR; INTRAVENOUS PRN
Status: DISCONTINUED | OUTPATIENT
Start: 2022-03-25 | End: 2022-03-25 | Stop reason: SURG

## 2022-03-25 RX ORDER — SODIUM CHLORIDE, SODIUM LACTATE, POTASSIUM CHLORIDE, CALCIUM CHLORIDE 600; 310; 30; 20 MG/100ML; MG/100ML; MG/100ML; MG/100ML
INJECTION, SOLUTION INTRAVENOUS CONTINUOUS
Status: DISCONTINUED | OUTPATIENT
Start: 2022-03-25 | End: 2022-03-25 | Stop reason: HOSPADM

## 2022-03-25 RX ORDER — LIDOCAINE HYDROCHLORIDE 20 MG/ML
INJECTION, SOLUTION EPIDURAL; INFILTRATION; INTRACAUDAL; PERINEURAL PRN
Status: DISCONTINUED | OUTPATIENT
Start: 2022-03-25 | End: 2022-03-25 | Stop reason: SURG

## 2022-03-25 RX ORDER — HYDRALAZINE HYDROCHLORIDE 20 MG/ML
5 INJECTION INTRAMUSCULAR; INTRAVENOUS
Status: DISCONTINUED | OUTPATIENT
Start: 2022-03-25 | End: 2022-03-25 | Stop reason: HOSPADM

## 2022-03-25 RX ORDER — EPINEPHRINE 1 MG/ML(1)
AMPUL (ML) INJECTION
Status: DISCONTINUED | OUTPATIENT
Start: 2022-03-25 | End: 2022-03-25 | Stop reason: HOSPADM

## 2022-03-25 RX ORDER — OXYCODONE HCL 5 MG/5 ML
10 SOLUTION, ORAL ORAL
Status: COMPLETED | OUTPATIENT
Start: 2022-03-25 | End: 2022-03-25

## 2022-03-25 RX ORDER — LABETALOL HYDROCHLORIDE 5 MG/ML
5 INJECTION, SOLUTION INTRAVENOUS
Status: DISCONTINUED | OUTPATIENT
Start: 2022-03-25 | End: 2022-03-25 | Stop reason: HOSPADM

## 2022-03-25 RX ORDER — CEFAZOLIN SODIUM 1 G/3ML
INJECTION, POWDER, FOR SOLUTION INTRAMUSCULAR; INTRAVENOUS PRN
Status: DISCONTINUED | OUTPATIENT
Start: 2022-03-25 | End: 2022-03-25 | Stop reason: SURG

## 2022-03-25 RX ORDER — ONDANSETRON 2 MG/ML
INJECTION INTRAMUSCULAR; INTRAVENOUS PRN
Status: DISCONTINUED | OUTPATIENT
Start: 2022-03-25 | End: 2022-03-25 | Stop reason: SURG

## 2022-03-25 RX ORDER — NEOSTIGMINE METHYLSULFATE 1 MG/ML
INJECTION, SOLUTION INTRAVENOUS PRN
Status: DISCONTINUED | OUTPATIENT
Start: 2022-03-25 | End: 2022-03-25 | Stop reason: SURG

## 2022-03-25 RX ORDER — KETOROLAC TROMETHAMINE 30 MG/ML
INJECTION, SOLUTION INTRAMUSCULAR; INTRAVENOUS PRN
Status: DISCONTINUED | OUTPATIENT
Start: 2022-03-25 | End: 2022-03-25 | Stop reason: SURG

## 2022-03-25 RX ORDER — SCOLOPAMINE TRANSDERMAL SYSTEM 1 MG/1
1 PATCH, EXTENDED RELEASE TRANSDERMAL
Status: DISCONTINUED | OUTPATIENT
Start: 2022-03-25 | End: 2022-03-25 | Stop reason: HOSPADM

## 2022-03-25 RX ORDER — METOPROLOL TARTRATE 1 MG/ML
1 INJECTION, SOLUTION INTRAVENOUS
Status: DISCONTINUED | OUTPATIENT
Start: 2022-03-25 | End: 2022-03-25 | Stop reason: HOSPADM

## 2022-03-25 RX ORDER — DEXAMETHASONE SODIUM PHOSPHATE 10 MG/ML
INJECTION, SOLUTION INTRAMUSCULAR; INTRAVENOUS
Status: COMPLETED | OUTPATIENT
Start: 2022-03-25 | End: 2022-03-25

## 2022-03-25 RX ORDER — GLYCOPYRROLATE 0.2 MG/ML
INJECTION INTRAMUSCULAR; INTRAVENOUS PRN
Status: DISCONTINUED | OUTPATIENT
Start: 2022-03-25 | End: 2022-03-25 | Stop reason: SURG

## 2022-03-25 RX ORDER — DEXAMETHASONE SODIUM PHOSPHATE 4 MG/ML
INJECTION, SOLUTION INTRA-ARTICULAR; INTRALESIONAL; INTRAMUSCULAR; INTRAVENOUS; SOFT TISSUE PRN
Status: DISCONTINUED | OUTPATIENT
Start: 2022-03-25 | End: 2022-03-25 | Stop reason: SURG

## 2022-03-25 RX ORDER — SODIUM CHLORIDE, SODIUM LACTATE, POTASSIUM CHLORIDE, CALCIUM CHLORIDE 600; 310; 30; 20 MG/100ML; MG/100ML; MG/100ML; MG/100ML
INJECTION, SOLUTION INTRAVENOUS CONTINUOUS
Status: ACTIVE | OUTPATIENT
Start: 2022-03-25 | End: 2022-03-25

## 2022-03-25 RX ORDER — DIPHENHYDRAMINE HYDROCHLORIDE 50 MG/ML
12.5 INJECTION INTRAMUSCULAR; INTRAVENOUS
Status: DISCONTINUED | OUTPATIENT
Start: 2022-03-25 | End: 2022-03-25 | Stop reason: HOSPADM

## 2022-03-25 RX ORDER — ONDANSETRON 2 MG/ML
4 INJECTION INTRAMUSCULAR; INTRAVENOUS
Status: DISCONTINUED | OUTPATIENT
Start: 2022-03-25 | End: 2022-03-25 | Stop reason: HOSPADM

## 2022-03-25 RX ORDER — HYDROMORPHONE HYDROCHLORIDE 1 MG/ML
0.1 INJECTION, SOLUTION INTRAMUSCULAR; INTRAVENOUS; SUBCUTANEOUS
Status: DISCONTINUED | OUTPATIENT
Start: 2022-03-25 | End: 2022-03-25 | Stop reason: HOSPADM

## 2022-03-25 RX ORDER — ROPIVACAINE HYDROCHLORIDE 5 MG/ML
INJECTION, SOLUTION EPIDURAL; INFILTRATION; PERINEURAL
Status: COMPLETED | OUTPATIENT
Start: 2022-03-25 | End: 2022-03-25

## 2022-03-25 RX ORDER — MEPERIDINE HYDROCHLORIDE 25 MG/ML
12.5 INJECTION INTRAMUSCULAR; INTRAVENOUS; SUBCUTANEOUS
Status: DISCONTINUED | OUTPATIENT
Start: 2022-03-25 | End: 2022-03-25 | Stop reason: HOSPADM

## 2022-03-25 RX ORDER — OXYCODONE HCL 5 MG/5 ML
5 SOLUTION, ORAL ORAL
Status: COMPLETED | OUTPATIENT
Start: 2022-03-25 | End: 2022-03-25

## 2022-03-25 RX ORDER — ACETAMINOPHEN 500 MG
1000 TABLET ORAL ONCE
Status: COMPLETED | OUTPATIENT
Start: 2022-03-25 | End: 2022-03-25

## 2022-03-25 RX ORDER — HYDROMORPHONE HYDROCHLORIDE 2 MG/ML
INJECTION, SOLUTION INTRAMUSCULAR; INTRAVENOUS; SUBCUTANEOUS PRN
Status: DISCONTINUED | OUTPATIENT
Start: 2022-03-25 | End: 2022-03-25 | Stop reason: SURG

## 2022-03-25 RX ORDER — MIDAZOLAM HYDROCHLORIDE 1 MG/ML
1 INJECTION INTRAMUSCULAR; INTRAVENOUS
Status: DISCONTINUED | OUTPATIENT
Start: 2022-03-25 | End: 2022-03-25 | Stop reason: HOSPADM

## 2022-03-25 RX ADMIN — SODIUM CHLORIDE, POTASSIUM CHLORIDE, SODIUM LACTATE AND CALCIUM CHLORIDE: 600; 310; 30; 20 INJECTION, SOLUTION INTRAVENOUS at 08:57

## 2022-03-25 RX ADMIN — MIDAZOLAM HYDROCHLORIDE 1 MG: 1 INJECTION, SOLUTION INTRAMUSCULAR; INTRAVENOUS at 07:23

## 2022-03-25 RX ADMIN — HYDROMORPHONE HYDROCHLORIDE 0.2 MG: 2 INJECTION INTRAMUSCULAR; INTRAVENOUS; SUBCUTANEOUS at 08:20

## 2022-03-25 RX ADMIN — FENTANYL CITRATE 50 MCG: 50 INJECTION, SOLUTION INTRAMUSCULAR; INTRAVENOUS at 07:36

## 2022-03-25 RX ADMIN — LIDOCAINE HYDROCHLORIDE 0.5 ML: 10 INJECTION, SOLUTION EPIDURAL; INFILTRATION; INTRACAUDAL; PERINEURAL at 06:41

## 2022-03-25 RX ADMIN — ROCURONIUM BROMIDE 40 MG: 10 INJECTION, SOLUTION INTRAVENOUS at 07:40

## 2022-03-25 RX ADMIN — FENTANYL CITRATE 25 MCG: 50 INJECTION, SOLUTION INTRAMUSCULAR; INTRAVENOUS at 09:37

## 2022-03-25 RX ADMIN — ACETAMINOPHEN 1000 MG: 500 TABLET, FILM COATED ORAL at 06:41

## 2022-03-25 RX ADMIN — FENTANYL CITRATE 25 MCG: 50 INJECTION, SOLUTION INTRAMUSCULAR; INTRAVENOUS at 09:32

## 2022-03-25 RX ADMIN — FENTANYL CITRATE 25 MCG: 50 INJECTION, SOLUTION INTRAMUSCULAR; INTRAVENOUS at 10:02

## 2022-03-25 RX ADMIN — ONDANSETRON 4 MG: 2 INJECTION INTRAMUSCULAR; INTRAVENOUS at 09:05

## 2022-03-25 RX ADMIN — SCOPALAMINE 1 PATCH: 1 PATCH, EXTENDED RELEASE TRANSDERMAL at 06:42

## 2022-03-25 RX ADMIN — CEFAZOLIN 2 G: 330 INJECTION, POWDER, FOR SOLUTION INTRAMUSCULAR; INTRAVENOUS at 07:47

## 2022-03-25 RX ADMIN — HYDROMORPHONE HYDROCHLORIDE 0.2 MG: 2 INJECTION INTRAMUSCULAR; INTRAVENOUS; SUBCUTANEOUS at 08:55

## 2022-03-25 RX ADMIN — DEXAMETHASONE SODIUM PHOSPHATE 2 MG: 10 INJECTION, SOLUTION INTRAMUSCULAR; INTRAVENOUS at 07:24

## 2022-03-25 RX ADMIN — LIDOCAINE HYDROCHLORIDE 50 MG: 20 INJECTION, SOLUTION EPIDURAL; INFILTRATION; INTRACAUDAL; PERINEURAL at 07:40

## 2022-03-25 RX ADMIN — OXYCODONE HYDROCHLORIDE 5 MG: 5 SOLUTION ORAL at 09:33

## 2022-03-25 RX ADMIN — FENTANYL CITRATE 50 MCG: 50 INJECTION, SOLUTION INTRAMUSCULAR; INTRAVENOUS at 07:23

## 2022-03-25 RX ADMIN — EPHEDRINE SULFATE 5 MG: 50 INJECTION INTRAMUSCULAR; INTRAVENOUS; SUBCUTANEOUS at 07:49

## 2022-03-25 RX ADMIN — PROPOFOL 150 MG: 10 INJECTION, EMULSION INTRAVENOUS at 07:40

## 2022-03-25 RX ADMIN — KETOROLAC TROMETHAMINE 15 MG: 30 INJECTION, SOLUTION INTRAMUSCULAR at 09:05

## 2022-03-25 RX ADMIN — GLYCOPYRROLATE 0.2 MG: 0.2 INJECTION INTRAMUSCULAR; INTRAVENOUS at 08:50

## 2022-03-25 RX ADMIN — FENTANYL CITRATE 25 MCG: 50 INJECTION, SOLUTION INTRAMUSCULAR; INTRAVENOUS at 09:47

## 2022-03-25 RX ADMIN — ONDANSETRON 4 MG: 2 INJECTION INTRAMUSCULAR; INTRAVENOUS at 07:56

## 2022-03-25 RX ADMIN — DEXAMETHASONE SODIUM PHOSPHATE 4 MG: 4 INJECTION, SOLUTION INTRAMUSCULAR; INTRAVENOUS at 07:49

## 2022-03-25 RX ADMIN — NEOSTIGMINE METHYLSULFATE 2 MG: 1 INJECTION INTRAVENOUS at 09:09

## 2022-03-25 RX ADMIN — FENTANYL CITRATE 25 MCG: 50 INJECTION, SOLUTION INTRAMUSCULAR; INTRAVENOUS at 09:40

## 2022-03-25 RX ADMIN — ROPIVACAINE HYDROCHLORIDE 15 ML: 5 INJECTION, SOLUTION EPIDURAL; INFILTRATION; PERINEURAL at 07:24

## 2022-03-25 RX ADMIN — GLYCOPYRROLATE 0.4 MG: 0.2 INJECTION INTRAMUSCULAR; INTRAVENOUS at 09:09

## 2022-03-25 RX ADMIN — HYDROMORPHONE HYDROCHLORIDE 0.4 MG: 2 INJECTION INTRAMUSCULAR; INTRAVENOUS; SUBCUTANEOUS at 08:06

## 2022-03-25 RX ADMIN — SODIUM CHLORIDE, POTASSIUM CHLORIDE, SODIUM LACTATE AND CALCIUM CHLORIDE: 600; 310; 30; 20 INJECTION, SOLUTION INTRAVENOUS at 06:42

## 2022-03-25 ASSESSMENT — FIBROSIS 4 INDEX: FIB4 SCORE: 0.98

## 2022-03-25 ASSESSMENT — PAIN SCALES - GENERAL: PAIN_LEVEL: 0

## 2022-03-25 ASSESSMENT — PAIN DESCRIPTION - PAIN TYPE
TYPE: SURGICAL PAIN
TYPE: SURGICAL PAIN

## 2022-03-25 NOTE — OR NURSING
1023 - Pt arrived from PACU, report received from EM RN, pt rating pain tolerable 4/10, no nausea    1040 - Pt dressed, sitting in chair,  at bedside, IS given and pt demonstrates/verbalizes understanding, VSS    1055 - Pt meets criteria for discharged home.  Discharge instructions given, pt and  verbalize understanding.    1106 - IV discontinued, Pt discharge via wheelchair with belongings by CNA to responsible adult.

## 2022-03-25 NOTE — ANESTHESIA PROCEDURE NOTES
Airway    Date/Time: 3/25/2022 7:40 AM  Performed by: Jonny Momin M.D.  Authorized by: Jonny Momin M.D.     Location:  OR  Urgency:  Elective  Indications for Airway Management:  Anesthesia      Spontaneous Ventilation: absent    Sedation Level:  Deep  Preoxygenated: Yes    Patient Position:  Sniffing  Mask Difficulty Assessment:  1 - vent by mask  Final Airway Type:  Endotracheal airway  Final Endotracheal Airway:  ETT  Cuffed: Yes    Technique Used for Successful ETT Placement:  Direct laryngoscopy    Insertion Site:  Oral  Blade Type:  Bin  Laryngoscope Blade/Videolaryngoscope Blade Size:  3  ETT Size (mm):  7.0  Measured from:  Lips  ETT to Lips (cm):  21  Placement Verified by: auscultation and capnometry    Cormack-Lehane Classification:  Grade I - full view of glottis  Number of Attempts at Approach:  1  Number of Other Approaches Attempted:  0

## 2022-03-25 NOTE — ANESTHESIA PREPROCEDURE EVALUATION
Case: [id number] Date/Time: 03/25/22 0715    Procedures:       ARTHROSCOPY, SHOULDER, WITH ROTATOR CUFF REPAIR (Right )      ARTHROSCOPY, SHOULDER, WITH EXTENSIVE DEBRIDEMENT - VERSUS      TENODESIS, BICEPS - OPEN    Pre-op diagnosis: TENDINITIS OF RIGHT ROTATOR CUFF    Location: [facility] OR 04 / SURGERY [facility]    Surgeons: Nacho Sanchez M.D.      61y female with medical problems    P Med Hx:  Arthritis  Dyslipidemia, goal LDL below 130 - unclear maybe due to testosterone  High cholesterol  First degree AV block  AV block, Mona 2 - reported on stress echo at [facility]    P Surg Hx:  Breast Implant  Lap Gastric Band  ORIF R Wrist  Shoulder scope w RCR    Quit smoking 1998  4 EtOH drinks / wk    NPO    Relevant Problems   CARDIAC   (positive) AV blockMona 2 - reported on stress echo at [facility]   (positive) First degree AV block       Physical Exam    Airway   Mallampati: II  TM distance: >3 FB  Neck ROM: full       Cardiovascular - normal exam  Rhythm: regular  Rate: normal  (-) murmur     Dental - normal exam           Pulmonary - normal exam  Breath sounds clear to auscultation     Abdominal    Neurological - normal exam                 Anesthesia Plan    ASA 2       Plan - general and peripheral nerve block     Peripheral nerve block will be post-op pain control  Airway plan will be ETT    (INtrascalene)      Induction: intravenous    Postoperative Plan: Postoperative administration of opioids is intended.    Pertinent diagnostic labs and testing reviewed    Informed Consent:    Anesthetic plan and risks discussed with patient.    Use of blood products discussed with: patient whom consented to blood products.

## 2022-03-25 NOTE — DISCHARGE INSTRUCTIONS
ACTIVITY: Rest and take it easy for the first 24 hours.  A responsible adult is recommended to remain with you during that time.  It is normal to feel sleepy.  We encourage you to not do anything that requires balance, judgment or coordination.    MILD FLU-LIKE SYMPTOMS ARE NORMAL. YOU MAY EXPERIENCE GENERALIZED MUSCLE ACHES, THROAT IRRITATION, HEADACHE AND/OR SOME NAUSEA.    FOR 24 HOURS DO NOT:  Drive, operate machinery or run household appliances.  Drink beer or alcoholic beverages.   Make important decisions or sign legal documents.    SPECIAL INSTRUCTIONS: SEE ATTACHED DR BUCKLEY'S DISCHARGE INSTRUCTIONS SHEET    Nonweightbearing right upper extremity   Showering: Okay to shower postoperative day 4 March 29th (Tuesday). Remove dressings and allow soapy water from the shower to run over the incisions.    No scrubbing incisions.  Once out of shower blot dry with clean towel.  Place clean gauze and cover with Ace wrap or medical tape.    No lotions.  No tub soaks baths or swimming  If you develop any redness drainage or swelling or pain please call our office immediately or go to your local emergency room  Call Dr Buckley clinic with any questions or concerns   Physical Therapy: Begin within 2 weeks    DIET: To avoid nausea, slowly advance diet as tolerated, avoiding spicy or greasy foods for the first day.  Add more substantial food to your diet according to your physician's instructions.  INCREASE FLUIDS AND FIBER TO AVOID CONSTIPATION.      FOLLOW-UP APPOINTMENT:  A follow-up appointment should be arranged with your doctor in 2 weeks; call to schedule.    You should CALL YOUR PHYSICIAN if you develop:  Fever greater than 101 degrees F.  Pain not relieved by medication, or persistent nausea or vomiting.  Excessive bleeding (blood soaking through dressing) or unexpected drainage from the wound.  Extreme redness or swelling around the incision site, drainage of pus or foul smelling drainage.  Inability  to urinate or empty your bladder within 8 hours.  Problems with breathing or chest pain.    You should call 911 if you develop problems with breathing or chest pain.  If you are unable to contact your doctor or surgical center, you should go to the nearest emergency room or urgent care center.  Physician's telephone #:   Dr Sanchez [phone]    If any questions arise, call your doctor.  If your doctor is not available, please feel free to call the Surgical Center at [phone].     A registered nurse may call you a few days after your surgery to see how you are doing after your procedure.    MEDICATIONS: Resume taking daily medication.  Take prescribed pain medication with food.  If no medication is prescribed, you may take non-aspirin pain medication if needed.  PAIN MEDICATION CAN BE VERY CONSTIPATING.  Take a stool softener or laxative such as senokot, pericolace, or milk of magnesia if needed.     Last pain medication given at Oxycodone 5mg at 09:33am    If your physician has prescribed pain medication that includes Acetaminophen (Tylenol), do not take additional Acetaminophen (Tylenol) while taking the prescribed medication.    Depression / Suicide Risk    As you are discharged from this [facility] facility, it is important to learn how to keep safe from harming yourself.    Recognize the warning signs:  · Abrupt changes in personality, positive or negative- including increase in energy   · Giving away possessions  · Change in eating patterns- significant weight changes-  positive or negative  · Change in sleeping patterns- unable to sleep or sleeping all the time   · Unwillingness or inability to communicate  · Depression  · Unusual sadness, discouragement and loneliness  · Talk of wanting to die  · Neglect of personal appearance   · Rebelliousness- reckless behavior  · Withdrawal from people/activities they love  · Confusion- inability to concentrate     If you or a loved one observes any of  these behaviors or has concerns about self-harm, here's what you can do:  · Talk about it- your feelings and reasons for harming yourself  · Remove any means that you might use to hurt yourself (examples: pills, rope, extension cords, firearm)  · Get professional help from the community (Mental Health, Substance Abuse, psychological counseling)  · Do not be alone:Call your Safe Contact- someone whom you trust who will be there for you.  · Call your local CRISIS HOTLINE 397-4753 or 024-673-9039  · Call your local Children's Mobile Crisis Response Team [place] (490) 672-7806 or www.[employer]  · Call the toll free National Suicide Prevention Hotlines   · National Suicide Prevention Lifeline 374-841-JYJQ (1484)  · [facility] 800-SUICIDE (681-8485)        Peripheral Nerve Block Discharge Instructions from Same Day Surgery and Inpatient :    What to Expect - Upper Extremity  · You may experience numbness and weakness in shoulder, arm and hand  on the same side as your surgery  · This is normal. For some people, this may be an unpleasant sensation. Be very careful with your numb limb  · Ask for help when you need it  Shoulder Surgery Side Effects  · In addition to numbness and weakness you may experience other symptoms  · Other nerves that are close to those nerves injected can also be affected by local anesthesia  · You may experience a hoarseness in your voice  · Your breathing may feel different  · You may also notice drooping of your eyelid, pupil constriction, and decreased sweating, on the side of your surgery  · All of these side effects are normal and will resolve when the local anesthetic wears off   Prevent Injury  · Protect the limb like a baby  · Beware of exposing your limb to extreme heat or cold or trauma  · The limb may be injured without you noticing because it is numb  · Keep the limb elevated whenever possible  · Do not sleep on the limb  · Change the position of the limb  "regularly  · Avoid putting pressure on your surgical limb  Pain Control  · The initial block on the day of surgery will make your extremity feel \"numb\"  · Any consecutive injection including prior to discharge from the hospital will make your extremity feel \"numb\"  · You may feel an aching or burning when the local anesthesia starts to wear off  · Take pain pills as prescribed by your surgeon  · Call your surgeon or anesthesiologist if you do not have adequate pain control    Scopolamine skin patches  What is this medicine?  SCOPOLAMINE (skoe EDGARDO a meen) is used to prevent nausea and vomiting caused by motion sickness, anesthesia and surgery.  This medicine may be used for other purposes; ask your health care provider or pharmacist if you have questions.  COMMON BRAND NAME(S): Transderm Scop  What should I tell my health care provider before I take this medicine?  They need to know if you have any of these conditions:  · are scheduled to have a gastric secretion test  · glaucoma  · heart disease  · kidney disease  · liver disease  · lung or breathing disease, like asthma  · mental illness  · prostate disease  · seizures  · stomach or intestine problems  · trouble passing urine  · an unusual or allergic reaction to scopolamine, atropine, other medicines, foods, dyes, or preservatives  · pregnant or trying to get pregnant  · breast-feeding  How should I use this medicine?  This medicine is for external use only. Follow the directions on the prescription label. Wear only 1 patch at a time. Choose an area behind the ear, that is clean, dry, hairless and free from any cuts or irritation. Wipe the area with a clean dry tissue. Peel off the plastic backing of the skin patch, trying not to touch the adhesive side with your hands. Do not cut the patches. Firmly apply to the area you have chosen, with the metallic side of the patch to the skin and the tan-colored side showing. Once firmly in place, wash your hands well with " soap and water. Do not get this medicine into your eyes.  After removing the patch, wash your hands and the area behind your ear thoroughly with soap and water. The patch will still contain some medicine after use. To avoid accidental contact or ingestion by children or pets, fold the used patch in half with the sticky side together and throw away in the trash out of the reach of children and pets. If you need to use a second patch after you remove the first, place it behind the other ear.  A special MedGuide will be given to you by the pharmacist with each prescription and refill. Be sure to read this information carefully each time.  Talk to your pediatrician regarding the use of this medicine in children. Special care may be needed.  Overdosage: If you think you have taken too much of this medicine contact a poison control center or emergency room at once.  NOTE: This medicine is only for you. Do not share this medicine with others.  What if I miss a dose?  This does not apply. This medicine is not for regular use.  What may interact with this medicine?  · alcohol  · antihistamines for allergy cough and cold  · atropine  · certain medicines for anxiety or sleep  · certain medicines for bladder problems like oxybutynin, tolterodine  · certain medicines for depression like amitriptyline, fluoxetine, sertraline  · certain medicines for stomach problems like dicyclomine, hyoscyamine  · certain medicines for Parkinson's disease like benztropine, trihexyphenidyl  · certain medicines for seizures like phenobarbital, primidone  · general anesthetics like halothane, isoflurane, methoxyflurane, propofol  · ipratropium  · local anesthetics like lidocaine, pramoxine, tetracaine  · medicines that relax muscles for surgery  · phenothiazines like chlorpromazine, mesoridazine, prochlorperazine, thioridazine  · narcotic medicines for pain  · other belladonna alkaloids  This list may not describe all possible interactions. Give  your health care provider a list of all the medicines, herbs, non-prescription drugs, or dietary supplements you use. Also tell them if you smoke, drink alcohol, or use illegal drugs. Some items may interact with your medicine.  What should I watch for while using this medicine?  Limit contact with water while swimming and bathing because the patch may fall off. If the patch falls off, throw it away and put a new one behind the other ear.  You may get drowsy or dizzy. Do not drive, use machinery, or do anything that needs mental alertness until you know how this medicine affects you. Do not stand or sit up quickly, especially if you are an older patient. This reduces the risk of dizzy or fainting spells. Alcohol may interfere with the effect of this medicine. Avoid alcoholic drinks.  Your mouth may get dry. Chewing sugarless gum or sucking hard candy, and drinking plenty of water may help. Contact your healthcare professional if the problem does not go away or is severe.  This medicine may cause dry eyes and blurred vision. If you wear contact lenses, you may feel some discomfort. Lubricating drops may help. See your healthcare professional if the problem does not go away or is severe.  If you are going to need surgery, an MRI, CT scan, or other procedure, tell your healthcare professional that you are using this medicine. You may need to remove the patch before the procedure.  What side effects may I notice from receiving this medicine?  Side effects that you should report to your doctor or health care professional as soon as possible:  · allergic reactions like skin rash, itching or hives; swelling of the face, lips, or tongue  · blurred vision  · changes in vision  · confusion  · dizziness  · eye pain  · fast, irregular heartbeat  · hallucinations, loss of contact with reality  · nausea, vomiting  · pain or trouble passing urine  · restlessness  · seizures  · skin irritation  · stomach pain  Side effects that  usually do not require medical attention (report to your doctor or health care professional if they continue or are bothersome):  · drowsiness  · dry mouth  · headache  · sore throat  This list may not describe all possible side effects. Call your doctor for medical advice about side effects. You may report side effects to FDA at 8-259-NDF-8899.  Where should I keep my medicine?  Keep out of the reach of children.  Store at room temperature between 20 and 25 degrees C (68 and 77 degrees F). Keep this medicine in the foil package until ready to use. Throw away any unused medicine after the expiration date.  NOTE: This sheet is a summary. It may not cover all possible information. If you have questions about this medicine, talk to your doctor, pharmacist, or health care provider.  © 2020 [employer] (2019-03-08 16:14:46)

## 2022-03-25 NOTE — OR NURSING
0940: Assumed care. Pt AAO. Surgical dressing CDI, shoulder immobilizer in place, Affected extremity cap refill brisk.   0947: States pain level of 5/10 and not yet tolerable. Treat per MAR  1005: States pain not yet tolerable. No nausea. Tolerating sips of water  1015: Meets criteria for Stage 2

## 2022-03-25 NOTE — OR NURSING
0925-Pt arrived from OR post right shoulder athroscopy , report received. Pt breathing spontaneously on O2 mask at 6L. Dressing CDI, cold pack placed. CMS intact. Awake, alert, reports pain 4/10, denies any nausea.    0932-medicated for pain 4/10     0939-called and updated patients  at this time and will be here in about 45 minutes     0940-pain still 4/10 but improving. Medicated per orders. Report to Em at this time.

## 2022-03-25 NOTE — OP REPORT
OPERATIVE NOTE     DATE OF PROCEDURE: 3/25/2022           PRE-OP DIAGNOSIS:  1.  Right shoulder impingement  2.  Right shoulder rotator cuff tear  3.  Right shoulder biceps tendinitis           POST-OP DIAGNOSIS: same           PROCEDURE:  1.  Right shoulder arthroscopic rotator cuff repair  2.  Right shoulder biceps tenotomy  3.  Right shoulder subacromial decompression  4.  Right shoulder limited debridement           SURGEON: Nacho Sanchez M.D. - Primary           ASSISTANT: Sindy vigil, physician assistant     Certified first assist was required for critical portions of the case including patient positioning manipulation possibly graft preparation retraction suture management wound closure as well as other critical portions of the case.  I be unable to perform these portion of the case by myself there were no other certified first assist available    ANESTHESIA: General with interscalene nerve block           ESTIMATED BLOOD LOSS: Minimal                  SPECIMENS: None            COMPLICATIONS: None none           CONDITION: Stable            OPERATIVE INDICATIONS AND DESCRIPTION OF PROCEDURE:     I met the patient in the preoperative holding area.  I had a full discussion with them regarding multiple options for the patient's condition including nonoperative management.  Again today I offered them nonoperative treatment modalities.  Regarding operative options I specifically I outlined right shoulder arthroscopic rotator cuff repair biceps tenotomy and subacromial decompression. I discussed some possible complications including bleeding possibly requiring transfusion, infection, neurovascular damage, malunion, nonunion, failure of implants, failure of surgery, chronic pain, need for revision surgery, instability, limb length discrepancy, prolonged rehab, weight bearing restrictions, DVT, PE, MI, stroke and death. After going over risks and benefits making no promises either guaranteed or implied patient  elected to proceed.  At this point informed consent was signed.  A surgical marking pen was used to place my initials on the patient's right shoulder.    Patient was brought back to the operating room.  They were placed upright on a beach chair table all bony prominences padded very well to prevent neuropraxia's.  They were secured to the table with a strap.  General anesthesia was introduced.  trimano were used for for positioning.  At this point the right upper extremity was prepped in sterile standard fashion.  At this point a timeout was performed with all parties in the room ceasing activity. Informed consent sheet was visible confirming the patient's name date of birth MRN and matched their wristband. Antibiotics 2 g Ancef were confirmed and the right upper extremity was confirmed as the correct surgical site.  My surgical initials were visible on this extremity.  At this point we were cleared to proceed with the procedure.    Exam under anesthesia revealed forward flexion 160 external rotation 40 internal rotation lower lumbar spine    We began by establishing our posterior lateral viewing portal.  This was based off of the posterior lateral aspect of the acromion keeping her portal inferior and medial to the space.  Sharp dissection carried down through the skin and an atraumatic trocar was introduced in the joint.  Then at this point under needle localization we established an anterior working portal.  This was done just lateral to the coracoid.  At this point sharp dissection carried down through skin only then a atraumatic cannula was placed.  At this point we performed a diagnostic arthroscopy with the following findings:    This point we turned our attention to the limited debridement.  Significant time was spent in the glenohumeral joint space debriding both the chondral surface of the humeral head as well as glenoid.  Time was spent debriding the labrum that was degenerative in nature.  Debrided  extensive synovitis within the anterior rotator interval as well as fraying of the rotator cuff tendons.   Once we have performed this arthroscopic scissor was introduced to cut the biceps tendon at the level of the insertion on the labrum.  Bovie cautery was used to flatten out this cut and make it flush with the remaining labrum.  We did tag the portion of the tear with a spinal needle and placed a PDS suture in it in order for easier visualization in the subacromial space    We then turned our attention to the subacromial space.  At this point we established a lateral working portal under needle localization in line with the rotator cuff tendon.  Sharp dissection carried down through the skin and a cannula was introduced to assist with fluid management.  We did perform a limited debridement of the rotator cuff bursa in order to facilitate visualizing our tear.  Once we had identified the entirety of the rotator cuff tendon with the rotator cuff tear we did feel that a single anchor medially pulled over to a single lateral row was warranted as it was not very wide approximately 5 mm to 10 mm in width.  We removed our stay suture.  I used a elevator as well as a banana blade to elevate the partial-thickness tear off of the footprint and our site that was marked with a PDS suture.  We made sure that we are easily able to visualize the articular surface of the humerus.  We began by debriding the footprint.  Using Bovie cautery then eventually a motorized bur to appreciate small punctate bleeding to facilitate healing we debrided the footprint.  We then placed 1 anchors fiber tack DR medially just adjacent to the chondral surface in the very medial aspect of the footprint.  We then performed a speed bridge type repair passing sutures medial within the tendon pulling them over to a lateral row.  We used our independent sutures in a ripstop fashion tying and independent not securing it down and cutting the ends.  We  placed a single 4.75 swivel lock anchor in the lateral footprint after tensioning her sutures appropriately.  This demonstrated excellent reduction and compression of the tendon to bone.  Free ends of our sutures were cut    I then turned to the attention to the subacromial decompression.  Using Bovie cautery we outlined the anterior lateral edge of the acromion.  Using our bur as a cutting block we removed approximately 4 mm of the anterior leading edge of the acromion.  We cut this back so that the acromion was then flush with the remaining acromion and there were no further signs of impingement.    At this point we closed all of her wounds in interrupted fashion.  Sterile bandages were placed.  Patient was placed in a shoulder abduction orthosis woken up from general anesthesia taken to PACU in stable condition.  Counts were correct x2    POSTOPERATIVE PLAN:  Weight-bearing: Nonweightbearing right upper extremity  DVT prophylaxis: SCDs YURI hose  Antibiotics: Preop  Showering: Okay to shower postop day 4, no lotions no scrubbing's, place new dressing.  No tub soaks baths or swimming  Prescriptions: have been E prescribed  Follow-up:  in 2 weeks.  Call with any questions or concerns patient as well as his significant other have been counseled on signs of infection and to call immediately if they develop any of these  Physical Therapy: Begin within 2 weeks    Did call the patient's family to discuss the surgery with them.  Counseled them on signs of infection which they understood.  If they have any issues including any concerns with infection including redness drainage swelling pain they are to call our office immediately or go to the local emergency department    This is dictated with voice recognition software please excuse any errors

## 2022-03-25 NOTE — ANESTHESIA PROCEDURE NOTES
Peripheral Block    Date/Time: 3/25/2022 7:24 AM  Performed by: Jonny Momin M.D.  Authorized by: Jonny Momin M.D.     Patient Location:  Pre-op  Start Time:  3/25/2022 7:24 AM  End Time:  3/25/2022 7:26 AM  Reason for Block: at surgeon's request and post-op pain management ONLY    patient identified, IV checked, site marked, risks and benefits discussed, surgical consent, monitors and equipment checked, pre-op evaluation and timeout performed    Patient Position:  Supine  Prep: ChloraPrep    Monitoring:  Heart rate, continuous pulse ox and cardiac monitor  Block Region:  Upper Extremity  Upper Extremity - Block Type:  BRACHIAL PLEXUS block, Interscalene approach    Laterality:  Right  Procedures: ultrasound guided  Image captured, interpreted and electronically stored.  Local Infiltration:  Lidocaine  Strength:  1 %  Dose:  3 ml  Block Type:  Single-shot  Needle Length:  50mm  Needle Gauge:  22 G  Needle Localization:  Ultrasound guidance  Injection Assessment:  Negative aspiration for heme, no paresthesia on injection, incremental injection and local visualized surrounding nerve on ultrasound  Evidence of intravascular injection: No

## 2022-03-25 NOTE — ANESTHESIA TIME REPORT
Anesthesia Start and Stop Event Times     Date Time Event    3/25/2022 0728 Ready for Procedure     0734 Anesthesia Start     0930 Anesthesia Stop        Responsible Staff  03/25/22    Name Role Begin End    Jonny Momin M.D. Anesth 0734 0930        Preop Diagnosis (Free Text):  Pre-op Diagnosis     TENDINITIS OF RIGHT ROTATOR CUFF        Preop Diagnosis (Codes):    Premium Reason  Non-Premium    Comments:

## 2022-03-25 NOTE — ANESTHESIA POSTPROCEDURE EVALUATION
Patient: Britany Montesinos    Procedure Summary     Date: 03/25/22 Room / Location:  OR  / SURGERY [facility]    Anesthesia Start: 0734 Anesthesia Stop: 0930    Procedures:       ARTHROSCOPY, SHOULDER, WITH ROTATOR CUFF REPAIR (Right Shoulder)      ARTHROSCOPY, SHOULDER, WITH BICEPS TENOTOMY (Right Shoulder)      DECOMPRESSION, SUBACROMIAL ARTHROSCOPIC (Right Shoulder) Diagnosis: (TENDINITIS OF RIGHT ROTATOR CUFF)    Surgeons: Nacho Sanchez M.D. Responsible Provider: Jonny Momin M.D.    Anesthesia Type: general, peripheral nerve block ASA Status: 2          Final Anesthesia Type: general, peripheral nerve block  Last vitals  BP   Blood Pressure: 137/95    Temp   (!) 35.8 °C (96.4 °F)    Pulse   61   Resp   16    SpO2   94 %      Anesthesia Post Evaluation    Patient location during evaluation: PACU  Patient participation: complete - patient participated  Level of consciousness: awake and alert  Pain score: 0    Airway patency: patent  Anesthetic complications: no  Cardiovascular status: hemodynamically stable  Respiratory status: acceptable  Hydration status: euvolemic    PONV: none          No complications documented.     Nurse Pain Score: 0 (NPRS)

## 2022-04-15 NOTE — H&P
Surgery Orthopedic History & Physical Note    Date  4/14/2022    Primary Care Physician  Marisol Friedman D.O.    CC  * No Diagnosis Codes entered *    HPI  This is a 61 y.o. female who presented with right shoulder pain for right shoulder scope    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   • AV block, Mobitz 2 - reported on stress echo at [facility] 12/15/2020   • Dyslipidemia, goal LDL below 130 - unclear maybe due to testosterone    • First degree AV block    • High cholesterol     treated with supplements   • Hot flashes    • Night sweat    • Perimenopausal        Past Surgical History:   Procedure Laterality Date   • PB SHLDR ARTHROSCOP,SURG,W/ROTAT CUFF REPB Right 3/25/2022    Procedure: ARTHROSCOPY, SHOULDER, WITH ROTATOR CUFF REPAIR;  Surgeon: Nacho Sanchez M.D.;  Location: [facility];  Service: Orthopedics   • PB ARTHROSCOPY SHOULDER SURGICAL BICEPS TENODES* Right 3/25/2022    Procedure: ARTHROSCOPY, SHOULDER, WITH BICEPS TENOTOMY;  Surgeon: Nacho Sanchez M.D.;  Location: [facility];  Service: Orthopedics   • SHOULDER DECOMPRESSION Right 3/25/2022    Procedure: DECOMPRESSION, SUBACROMIAL ARTHROSCOPIC;  Surgeon: Nacho aSnchez M.D.;  Location: [facility];  Service: Orthopedics   • PB SHLDR ARTHROSCOP,SURG,W/ROTAT CUFF REPB Right 2/6/2020    Procedure: RT SHOULDER ARTHROSCOPY POSSIBLE ARTHROTOMY ARTHROSCOPIC SUBACROMIAL DECOMPRESSION DISTAL CLAVCILE EXCISON POSSIBLE ROTATOR CUFF REPAIR POSSIBLE BICEPS TENOTOMY;  Surgeon: Faustino Leong M.D.;  Location: [facility];  Service: Orthopedics   • BREAST IMPLANT REVISION     • GASTRIC BANDING LAPAROSCOPIC     • OPEN REDUCTION      WRIST   • OTHER NEUROLOGICAL SURG      radio frequency ablation lumbar facets       No current facility-administered medications for this encounter.     Current Outpatient Medications   Medication Sig Dispense Refill   • Amino Acids (AMINO ACID PO)  Take  by mouth.     • Red Yeast Rice 600 MG Cap Take  by mouth.     • Garlic 1200 MG Cap Take  by mouth.     • Multiple Vitamins-Minerals (IMMUNE SUPPORT PO) Take  by mouth.     • PECTIN-ZINC GLUCONATE-VIT C MT Use  in the mouth or throat.     • ibuprofen (MOTRIN) 400 MG Tab Take 400 mg by mouth every 6 hours as needed.     • testosterone (TESTIM/ANDROGEL) 50 MG/5GM (1%) Gel gel Apply 50 mg to skin as directed every day.     • progesterone (PROMETRIUM) 100 MG Cap Take 100 mg by mouth every day. Uses the cream     • HYDROcodone-acetaminophen (NORCO) 5-325 MG Tab per tablet Take 1-2 Tabs by mouth every four hours as needed.     • Omega 3 1000 MG Cap Take  by mouth.     • estrogens, conjugated (PREMARIN) 0.625 MG/GM Cream Insert 0.5 g in vagina every day. Indications: natural estrogen product     • Multiple Vitamin (MULTIVITAMINS PO) Take 1 Tab by mouth every day.         Social History     Socioeconomic History   • Marital status:      Spouse name: Not on file   • Number of children: Not on file   • Years of education: Not on file   • Highest education level: Not on file   Occupational History   • Not on file   Tobacco Use   • Smoking status: Former Smoker     Packs/day: 0.20     Years: 5.00     Pack years: 1.00     Types: Cigarettes     Quit date: 1998     Years since quittin.6   • Smokeless tobacco: Never Used   • Tobacco comment: rare/social for smkg at the time   Vaping Use   • Vaping Use: Never used   Substance and Sexual Activity   • Alcohol use: Yes     Alcohol/week: 2.4 oz     Types: 4 Standard drinks or equivalent per week     Comment: few x/month if that   • Drug use: No   • Sexual activity: Yes     Partners: Male     Comment:    Other Topics Concern   • Not on file   Social History Narrative   • Not on file     Social Determinants of Health     Financial Resource Strain: Not on file   Food Insecurity: Not on file   Transportation Needs: Not on file   Physical Activity: Not on file    Stress: Not on file   Social Connections: Not on file   Intimate Partner Violence: Not on file   Housing Stability: Not on file       Family History   Problem Relation Age of Onset   • Hypertension Mother    • Diabetes Mother    • Heart Disease Mother 77        CHF; age 85 in Aug 2020   • Other Father         Alzheimers   • Other Brother 41        sepsis       Allergies  Codeine    Review of Systems  Negative    Physical Exam    Vital Signs  Blood Pressure: 110/62   Temperature: 36.2 °C (97.2 °F)   Pulse: (!) 57   Respiration: 16   Pulse Oximetry: 90 %   Right shoulder pain on exam with movement    Labs:                    Radiology:  No orders to display         Assessment/Plan:  * No Diagnosis Codes entered *  Procedure(s):  ARTHROSCOPY, SHOULDER, WITH ROTATOR CUFF REPAIR  ARTHROSCOPY, SHOULDER, WITH BICEPS TENOTOMY  DECOMPRESSION, SUBACROMIAL ARTHROSCOPIC

## 2022-12-30 ENCOUNTER — APPOINTMENT (OUTPATIENT)
Dept: RADIOLOGY | Facility: MEDICAL CENTER | Age: 62
End: 2022-12-30
Attending: EMERGENCY MEDICINE
Payer: COMMERCIAL

## 2022-12-30 ENCOUNTER — HOSPITAL ENCOUNTER (EMERGENCY)
Facility: MEDICAL CENTER | Age: 62
End: 2022-12-30
Attending: EMERGENCY MEDICINE
Payer: COMMERCIAL

## 2022-12-30 VITALS
OXYGEN SATURATION: 98 % | DIASTOLIC BLOOD PRESSURE: 67 MMHG | SYSTOLIC BLOOD PRESSURE: 140 MMHG | WEIGHT: 220 LBS | BODY MASS INDEX: 32.58 KG/M2 | HEART RATE: 84 BPM | TEMPERATURE: 97.8 F | HEIGHT: 69 IN | RESPIRATION RATE: 16 BRPM

## 2022-12-30 DIAGNOSIS — S68.627A PARTIAL TRAUMATIC AMPUTATION OF LEFT LITTLE FINGER THROUGH PHALANX, INITIAL ENCOUNTER: ICD-10-CM

## 2022-12-30 DIAGNOSIS — S61.316A LACERATION OF RIGHT LITTLE FINGER WITHOUT FOREIGN BODY WITH DAMAGE TO NAIL, INITIAL ENCOUNTER: ICD-10-CM

## 2022-12-30 PROCEDURE — 304999 HCHG REPAIR-SIMPLE/INTERMED LEVEL 1

## 2022-12-30 PROCEDURE — 90471 IMMUNIZATION ADMIN: CPT

## 2022-12-30 PROCEDURE — 303747 HCHG EXTRA SUTURE

## 2022-12-30 PROCEDURE — 700101 HCHG RX REV CODE 250: Performed by: EMERGENCY MEDICINE

## 2022-12-30 PROCEDURE — A9270 NON-COVERED ITEM OR SERVICE: HCPCS | Performed by: EMERGENCY MEDICINE

## 2022-12-30 PROCEDURE — 99284 EMERGENCY DEPT VISIT MOD MDM: CPT

## 2022-12-30 PROCEDURE — 304217 HCHG IRRIGATION SYSTEM

## 2022-12-30 PROCEDURE — 73140 X-RAY EXAM OF FINGER(S): CPT | Mod: RT

## 2022-12-30 PROCEDURE — 700111 HCHG RX REV CODE 636 W/ 250 OVERRIDE (IP): Performed by: EMERGENCY MEDICINE

## 2022-12-30 PROCEDURE — 90715 TDAP VACCINE 7 YRS/> IM: CPT | Performed by: EMERGENCY MEDICINE

## 2022-12-30 PROCEDURE — 700102 HCHG RX REV CODE 250 W/ 637 OVERRIDE(OP): Performed by: EMERGENCY MEDICINE

## 2022-12-30 PROCEDURE — 29125 APPL SHORT ARM SPLINT STATIC: CPT

## 2022-12-30 PROCEDURE — 302874 HCHG BANDAGE ACE 2 OR 3""

## 2022-12-30 PROCEDURE — A6403 STERILE GAUZE>16 <= 48 SQ IN: HCPCS

## 2022-12-30 RX ORDER — CEPHALEXIN 500 MG/1
500 CAPSULE ORAL ONCE
Status: COMPLETED | OUTPATIENT
Start: 2022-12-30 | End: 2022-12-30

## 2022-12-30 RX ORDER — HYDROCODONE BITARTRATE AND ACETAMINOPHEN 5; 325 MG/1; MG/1
1 TABLET ORAL EVERY 6 HOURS PRN
Qty: 11 TABLET | Refills: 0 | Status: SHIPPED | OUTPATIENT
Start: 2022-12-30 | End: 2023-01-02

## 2022-12-30 RX ORDER — HYDROCODONE BITARTRATE AND ACETAMINOPHEN 5; 325 MG/1; MG/1
1 TABLET ORAL ONCE
Status: COMPLETED | OUTPATIENT
Start: 2022-12-30 | End: 2022-12-30

## 2022-12-30 RX ORDER — CEPHALEXIN 500 MG/1
500 CAPSULE ORAL 4 TIMES DAILY
Qty: 28 CAPSULE | Refills: 0 | Status: SHIPPED | OUTPATIENT
Start: 2022-12-30

## 2022-12-30 RX ADMIN — LIDOCAINE HYDROCHLORIDE 20 ML: 10 INJECTION, SOLUTION INFILTRATION; PERINEURAL at 18:45

## 2022-12-30 RX ADMIN — HYDROCODONE BITARTRATE AND ACETAMINOPHEN 1 TABLET: 5; 325 TABLET ORAL at 19:46

## 2022-12-30 RX ADMIN — CLOSTRIDIUM TETANI TOXOID ANTIGEN (FORMALDEHYDE INACTIVATED), CORYNEBACTERIUM DIPHTHERIAE TOXOID ANTIGEN (FORMALDEHYDE INACTIVATED), BORDETELLA PERTUSSIS TOXOID ANTIGEN (GLUTARALDEHYDE INACTIVATED), BORDETELLA PERTUSSIS FILAMENTOUS HEMAGGLUTININ ANTIGEN (FORMALDEHYDE INACTIVATED), BORDETELLA PERTUSSIS PERTACTIN ANTIGEN, AND BORDETELLA PERTUSSIS FIMBRIAE 2/3 ANTIGEN 0.5 ML: 5; 2; 2.5; 5; 3; 5 INJECTION, SUSPENSION INTRAMUSCULAR at 18:30

## 2022-12-30 RX ADMIN — CEPHALEXIN 500 MG: 500 CAPSULE ORAL at 19:47

## 2022-12-30 ASSESSMENT — FIBROSIS 4 INDEX: FIB4 SCORE: 1

## 2022-12-31 NOTE — ED PROVIDER NOTES
ED Provider Note        CHIEF COMPLAINT  Chief Complaint   Patient presents with    Hand Laceration     Patient was attempting to place a towel in an automatic towel reynolds and the lid of the laundry basket snapped onto her finger causing a laceration to her lateral right pinky finger. CMS intact. Finger wrapped at triage desk. Bleeding controlled.            HPI  LIMITATION TO HISTORY   Select: : None  OUTSIDE HISTORIAN(S):  Select: Family  at bedside    Britany Montesinos is a 62 y.o. female who presents with complaint of finger laceration.  Patient was at a local establishment and she reportedly was placing a towel back in a towel been when the lid closed onto the pinky finger of the right hand.  Patient is right-hand dominant.  She had immediate pain immediate bleeding obvious deformity to distal phalanx of the right pinky finger.  She reports severe pain in the affected finger she has had no other trauma she cannot recall her last tetanus she has been otherwise well recently.  Pain is worse with movement or manipulation no alleviating factors.    REVIEW OF SYSTEMS  Positive for right pinky finger pain laceration and deformity negative for other trauma or recent illness    PAST MEDICAL HISTORY   has a past medical history of Arthritis, AV block, Mobitz 2 - reported on stress echo at [facility] (12/15/2020), Dyslipidemia, goal LDL below 130 - unclear maybe due to testosterone, First degree AV block, High cholesterol, Hot flashes, Night sweat, and Perimenopausal.    SURGICAL HISTORY   has a past surgical history that includes breast implant revision; gastric banding laparoscopic; open reduction; shldr arthroscop,surg,w/rotat cuff repr (Right, 2/6/2020); other neurological surg; shldr arthroscop,surg,w/rotat cuff repr (Right, 3/25/2022); arthroscopy shoulder surgical biceps tenodes* (Right, 3/25/2022); and shoulder decompression (Right, 3/25/2022).    FAMILY HISTORY  Family History   Problem Relation Age of Onset     "Hypertension Mother     Diabetes Mother     Heart Disease Mother 77        CHF; age 85 in Aug 2020    Other Father         Alzheimers    Other Brother 41        sepsis         SOCIAL HISTORY  Social History     Tobacco Use    Smoking status: Former     Packs/day: 0.20     Years: 5.00     Pack years: 1.00     Types: Cigarettes     Quit date: 1998     Years since quittin.3    Smokeless tobacco: Never    Tobacco comments:     rare/social for smkg at the time   Vaping Use    Vaping Use: Never used   Substance and Sexual Activity    Alcohol use: Yes     Alcohol/week: 2.4 oz     Types: 4 Standard drinks or equivalent per week     Comment: few x/month if that    Drug use: No    Sexual activity: Yes     Partners: Male     Comment:            CURRENT MEDICATIONS  Home Medications       Reviewed by Pina Trevino R.N. (Registered Nurse) on 22 at 1753  Med List Status: Partial     Medication Last Dose Status   Amino Acids (AMINO ACID PO)  Active   estrogens, conjugated (PREMARIN) 0.625 MG/GM Cream  Active   Garlic 1200 MG Cap  Active   HYDROcodone-acetaminophen (NORCO) 5-325 MG Tab per tablet  Active   ibuprofen (MOTRIN) 400 MG Tab  Active   Multiple Vitamin (MULTIVITAMINS PO)  Active   Multiple Vitamins-Minerals (IMMUNE SUPPORT PO)  Active   Omega 3 1000 MG Cap  Active   PECTIN-ZINC GLUCONATE-VIT C MT  Active   progesterone (PROMETRIUM) 100 MG Cap  Active   Red Yeast Rice 600 MG Cap  Active   testosterone (TESTIM/ANDROGEL) 50 MG/5GM (1%) Gel gel  Active                    ALLERGIES  Allergies   Allergen Reactions    Codeine Nausea       PHYSICAL EXAM  VITAL SIGNS: BP (!) 147/69   Pulse 85   Temp 37.2 °C (98.9 °F) (Temporal)   Resp 16   Ht 1.753 m (5' 9\")   Wt 99.8 kg (220 lb)   LMP 2012   SpO2 99%   BMI 32.49 kg/m²    Constitutional: Alert and oriented x 3, .  HENT: Normocephalic, Atraumatic, Bilateral external ears normal. Nose normal.   Eyes: Pupils are equal and reactive. Conjunctiva " normal, non-icteric.   Heart: Regular rate and rythm, no murmurs, equal pulses peripherally x 4.    Lungs: Clear to auscultation bilaterally, no wheezes rales or rhonchi  GI: Soft nontender nondistended positive bowel sounds.      Skin: Partial amputation of the distal phalanx of the right pinky finger.  This is an avulsion laceration that begins just distal to the distal interphalangeal joint on the palmar aspect and extends obliquely towards the ulnar medial aspect.  Decreased current sensation to the distal aspect on the ulnar aspect there is preserved sensation on the radial aspect and the cap refill is preserved at the distal tip to less than 3 seconds.  MSK: Left upper bilateral lower extremities are unremarkable finger laceration in the right upper as above otherwise unremarkable.      Neurologic: Cranial nerves III through XII grossly intact no sensory deficit no cerebellar dysfunction.   Psychiatric: Appropriate affect for situation        DIAGNOSTIC STUDIES / PROCEDURES      RADIOLOGY  I have independently interpreted the diagnostic imaging associated with this visit and am waiting the final reading from the radiologist.       DX-FINGER(S) 2+ RIGHT   Final Result      1.  Displaced fracture of RIGHT 5th tuft with associated soft tissue injury.  Open fracture is not excluded.   2.  Mild osteoarthritis.            COURSE & MEDICAL DECISION MAKING  Pertinent Labs & Imaging studies reviewed. (See chart for details)      INITIAL ASSESSMENT AND PLAN        Escalation of care considered, and ultimately not performed: acute inpatient care management, however at this time, the patient is most appropriate for outpatient management.         Diagnostic tests and prescription drugs considered including, but not limited to: Select: Antibiotics pain medicine imaging .      Laceration Repair Procedure Note    Indication: Laceration    Procedure: The patient was placed in the appropriate position and anesthesia around the  laceration was obtained with a full digital block of the left short (pinkie) finger using 1% Lidocaine without epinephrine. The area was then irrigated with normal saline soak followed by normal saline high-pressure irrigation. The laceration was closed with 4-0 Ethilon using interrupted sutures.  Following suture repair patient had nail trephination to prevent accumulation of subungual hematoma.  There were no additional lacerations requiring repair. The wound area was then dressed with a sterile dressing.  Sterile dressing patient's finger was reduced and placed on AlumaFoam finger splint.    Total repaired wound length: 2.5 cm.     Other Items: Suture count: 9    The patient tolerated the procedure well.    Complications: None                   FINAL PROBLEM LIST AND PLAN      Patient had open fracture of the distal tip of the right pinky finger as above/partial amputation.  Patient likely disrupted the radial digital artery possibly the radial digital nerve however patient had preserved sensation on the ulnar aspect and patient had preserved capillary refill to the partially amputated distal tip of the finger.  Patient had irrigation and repair as above her tetanus was updated she was given Keflex in the emergency department.  Following repair patient had actually improved capillary refill to the distal tip.    Patient is placed on Keflex as an outpatient she is given a small prescription for Norco she is given instructions to follow-up with hand surgeon Dr. Casiano at the next available time.    Patient understands that the blood flow on the radial aspect of this finger was somewhat compromised by the injury and that some of the tissue was compromise.  We have given instructions as some of this tissue may become necrotic before it fully starts to heal.  She is given instructions to leave bandage in place for at least 24 hours and to replace with sterile dressing anytime after she takes the bandage  down.    Patient is given instructions to return here for worsening pain redness swelling numbness tingling any other acute symptom changes or concerns otherwise discharged in stable and improved condition.    The patient will not drink alcohol nor drive with prescribed medications. The patient will return for worsening symptoms and is stable at the time of discharge. The patient verbalizes understanding and will comply.    FINAL IMPRESSION  1. Laceration of right little finger without foreign body with damage to nail, initial encounter Active   2. Partial traumatic amputation of left little finger through phalanx, initial encounter Active   3.  Open fracture left distal phalanx of the pinky finger  4.  Laceration/partial amputation repair by ERP  5.  Nail trephination by ERP       Electronically signed by: Edgar Palmer M.D., 12/30/2022 6:17 PM

## 2022-12-31 NOTE — ED TRIAGE NOTES
Chief Complaint   Patient presents with    Hand Laceration     Patient was attempting to place a towel in an automatic towel reynolds and the lid of the laundry basket snapped onto her finger causing a laceration to her lateral right pinky finger. CMS intact. Finger wrapped at triage desk. Bleeding controlled.

## 2024-09-09 ENCOUNTER — APPOINTMENT (OUTPATIENT)
Dept: RADIOLOGY | Facility: MEDICAL CENTER | Age: 64
End: 2024-09-09
Attending: STUDENT IN AN ORGANIZED HEALTH CARE EDUCATION/TRAINING PROGRAM
Payer: COMMERCIAL

## 2024-09-09 DIAGNOSIS — G89.29 CHRONIC RIGHT SHOULDER PAIN: ICD-10-CM

## 2024-09-09 DIAGNOSIS — M25.511 CHRONIC RIGHT SHOULDER PAIN: ICD-10-CM

## 2024-09-09 PROCEDURE — 73221 MRI JOINT UPR EXTREM W/O DYE: CPT | Mod: RT

## (undated) DEVICE — DRAPE LARGE 3 QUARTER - (20/CA)

## (undated) DEVICE — HEAD HOLDER JUNIOR/ADULT

## (undated) DEVICE — GLOVE, LITE (PAIR)

## (undated) DEVICE — SUTURE 0 PDS-2 CTX 36 INCH - (24/BX)

## (undated) DEVICE — CANISTER SUCTION RIGID RED 1500CC (40EA/CA)

## (undated) DEVICE — KIT ANESTHESIA W/CIRCUIT & 3/LT BAG W/FILTER (20EA/CA)

## (undated) DEVICE — TUBE CONNECTING SUCTION - CLEAR PLASTIC STERILE 72 IN (50EA/CA)

## (undated) DEVICE — GLOVE BIOGEL INDICATOR SZ 8 SURGICAL PF LTX - (50/BX 4BX/CA)

## (undated) DEVICE — NEEDLE DAVIS TONSIL 1/2 CIR - (2EA/PK20PK/BX)

## (undated) DEVICE — GLOVE SURGICAL PROTEXIS PI 8.0 LF - (50PR/BX)

## (undated) DEVICE — SODIUM CHL IRRIGATION 0.9% 1000ML (12EA/CA)

## (undated) DEVICE — TOWEL STOP TIMEOUT SAFETY FLAG (40EA/CA)

## (undated) DEVICE — CLOSURE SKIN STRIP 1/2 X 4 IN - (STERI STRIP) (50/BX 4BX/CA)

## (undated) DEVICE — CANNULA TWIST IN 8.25MM X 7CM (5/BX)

## (undated) DEVICE — CANNULA GEMINI PASSPORT 20MM - (5/BX)

## (undated) DEVICE — MASK ANESTHESIA ADULT  - (100/CA)

## (undated) DEVICE — BONE WAX (12PK/BX)

## (undated) DEVICE — SUTURE 3-0 MONOCRYL PLUS PS-1 - 27 INCH (36/BX)

## (undated) DEVICE — LACTATED RINGERS INJ 1000 ML - (14EA/CA 60CA/PF)

## (undated) DEVICE — BAG SPONGE COUNT 10.25 X 32 - BLUE (250/CA)

## (undated) DEVICE — HUMID-VENT HEAT AND MOISTURE EXCHANGE- (50/BX)

## (undated) DEVICE — DRAPE SURGICAL U 77X120 - (10/CA)

## (undated) DEVICE — SENSOR SPO2 NEO LNCS ADHESIVE (20/BX) SEE USER NOTES

## (undated) DEVICE — BLADE MICRO SAW 25MM X 5.5MM

## (undated) DEVICE — SUTURE 3-0 VICRYL PLUS CT-1 - 8 X 18 INCH (12/BX)

## (undated) DEVICE — ARTHROWAND TURBOVAC 3.5/90 SCT

## (undated) DEVICE — ELECTRODE 850 FOAM ADHESIVE - HYDROGEL RADIOTRNSPRNT (50/PK)

## (undated) DEVICE — DRAPE U SPLIT IMP 54 X 76 - (24/CA)

## (undated) DEVICE — NEPTUNE 4 PORT MANIFOLD - (20/PK)

## (undated) DEVICE — ELECTRODE DUAL RETURN W/ CORD - (50/PK)

## (undated) DEVICE — CHLORAPREP 26 ML APPLICATOR - ORANGE TINT(25/CA)

## (undated) DEVICE — SHAVER4.0 AGGRESSIVE + FORMLA (5EA/BX)

## (undated) DEVICE — WATER IRRIGATION STERILE 1000ML (12EA/CA)

## (undated) DEVICE — PROTECTOR ULNA NERVE - (36PR/CA)

## (undated) DEVICE — SPIDER SHOULDER HOLDER (12EA/BX)

## (undated) DEVICE — TUBING PATIENT W/CONNECTOR REDEUCE (1EA)

## (undated) DEVICE — SUTURE GENERAL

## (undated) DEVICE — DRAPE IOBAN II INCISE 23X17 - (10EA/BX 4BX/CA)

## (undated) DEVICE — GOWN WARMING STANDARD FLEX - (30/CA)

## (undated) DEVICE — TUBING PUMP WITH CONNECTOR REDEUCE (1EA)

## (undated) DEVICE — SHAVER 5.5 RESECTOR FORMULA (5EA/BX )

## (undated) DEVICE — SUCTION INSTRUMENT YANKAUER BULBOUS TIP W/O VENT (50EA/CA)

## (undated) DEVICE — SODIUM CHL. IRRIGATION 0.9% 3000ML (4EA/CA 65CA/PF)

## (undated) DEVICE — SUTURE 3-0 ETHILON FS-1 - (36/BX) 30 INCH

## (undated) DEVICE — STOCKINETTE IMPERVIOUS 6 SM (30EA/CA)